# Patient Record
Sex: FEMALE | Race: WHITE | Employment: FULL TIME | URBAN - METROPOLITAN AREA
[De-identification: names, ages, dates, MRNs, and addresses within clinical notes are randomized per-mention and may not be internally consistent; named-entity substitution may affect disease eponyms.]

---

## 2017-07-27 ENCOUNTER — MYC MEDICAL ADVICE (OUTPATIENT)
Dept: FAMILY MEDICINE | Facility: CLINIC | Age: 32
End: 2017-07-27

## 2017-10-08 ENCOUNTER — HEALTH MAINTENANCE LETTER (OUTPATIENT)
Age: 32
End: 2017-10-08

## 2018-04-02 ENCOUNTER — TELEPHONE (OUTPATIENT)
Dept: FAMILY MEDICINE | Facility: CLINIC | Age: 33
End: 2018-04-02

## 2018-04-02 NOTE — TELEPHONE ENCOUNTER
Patient is wanting to schedule a procedure to have her IUD removed with . She is not sure if she wants to have another IUD implanted, but she does want some type of birth control. Please call to advise. Thank you

## 2018-04-09 NOTE — TELEPHONE ENCOUNTER
Patient is schedule for a Physical on 04/27/18. Wants to discuss options for BC, not sure if wants IUD removed or not. Will discuss at physical.  PAWAN Perez

## 2018-04-16 NOTE — PROGRESS NOTES
SUBJECTIVE:   CC: Arely Hebert is an 32 year old woman who presents for preventive health visit.     Healthy Habits:  Answers for HPI/ROS submitted by the patient on 4/27/2018   Annual Exam:  Getting at least 3 servings of Calcium per day:: Yes  Bi-annual eye exam:: NO  Dental care twice a year:: Yes  Sleep apnea or symptoms of sleep apnea:: None  Diet:: Regular (no restrictions)  Frequency of exercise:: None  Taking medications regularly:: Yes  Medication side effects:: Not applicable  Additional concerns today:: YES  PHQ-2 Score: 0          Planning pregnancy but has vacation to [place] in July.  Wondering about timing of IUD removal.      Today's PHQ-2 Score:   PHQ-2 ( 1999 [employer]) 4/27/2018 4/19/2018   Q1: Little interest or pleasure in doing things 0 0   Q2: Feeling down, depressed or hopeless 0 0   PHQ-2 Score 0 0   Q1: Little interest or pleasure in doing things Not at all -   Q2: Feeling down, depressed or hopeless Not at all -   PHQ-2 Score 0 -       Abuse: Current or Past(Physical, Sexual or Emotional)- No  Do you feel safe in your environment - Yes    Social History   Substance Use Topics     Smoking status: Never Smoker     Smokeless tobacco: Never Used     Alcohol use Yes      Comment: OCC     If you drink alcohol do you typically have >3 drinks per day or >7 drinks per week? No                     Reviewed orders with patient.  Reviewed health maintenance and updated orders accordingly - Yes    G 2 P 2   Patient's last menstrual period was 04/09/2018.     Fasting: No   Td: tdap 6/14       Last 3 Pap and HPV Results:   PAP / HPV 8/6/2014 1/25/2012   PAP NIL NIL                  Cholesterol: No results found for: CHOL  No results found for: HDL  No results found for: LDL  No results found for: TRIG  No results found for: CHOLHDLRATIO      MMG: NA  Dexa:  NA     Flex/colo: NA      Seat Belt: Yes    Sunscreen use: Yes   Calcium Intake: adeq  Health Care Directive: Yes   Sexually Active: Yes      Current contraception: Mirena IUD  History of abnormal Pap smear: No  Family history of colon/breast/ovarian cancer: Yes: see [facility]  Regular self breast exam: No  History of abnormal mammogram: No      Labs reviewed in EPIC  BP Readings from Last 3 Encounters:   04/27/18 119/73   04/19/18 120/72   04/15/16 125/77    Wt Readings from Last 3 Encounters:   04/27/18 177 lb (80.3 kg)   04/19/18 178 lb (80.7 kg)   04/15/16 182 lb (82.6 kg)                  Patient Active Problem List   Diagnosis     CARDIOVASCULAR SCREENING; LDL GOAL LESS THAN 160     Encounter for supervision of other normal pregnancy     Past Surgical History:   Procedure Laterality Date     NO HISTORY OF SURGERY         Social History   Substance Use Topics     Smoking status: Never Smoker     Smokeless tobacco: Never Used     Alcohol use Yes      Comment: OCC     Family History   Problem Relation Age of Onset     Breast Cancer Maternal Grandmother          Current Outpatient Prescriptions   Medication Sig Dispense Refill     levonorgestrel (MIRENA) 20 MCG/24HR IUD 1 each (20 mcg) by Intrauterine route daily 1 each 0     cyclobenzaprine (FLEXERIL) 10 MG tablet Take 1 tablet (10 mg) by mouth nightly as needed for muscle spasms (Patient not taking: Reported on 4/27/2018) 30 tablet 1       Mammogram not appropriate for this patient based on age.    Pertinent mammograms are reviewed under the imaging tab.      Reviewed and updated as needed this visit by clinical staff  Tobacco  Allergies  Meds  Problems  Med Hx  Surg Hx  Fam Hx  Soc Hx          Reviewed and updated as needed this visit by Provider  Allergies  Meds  Problems            ROS:  CONSTITUTIONAL: NEGATIVE for fever, chills, change in weight  INTEGUMENTARU/SKIN: NEGATIVE for worrisome rashes, moles or lesions  EYES: NEGATIVE for vision changes or irritation  ENT: NEGATIVE for ear, mouth and throat problems  RESP: NEGATIVE for significant cough or SOB  BREAST: NEGATIVE for masses,  "tenderness or discharge  CV: NEGATIVE for chest pain, palpitations or peripheral edema  GI: NEGATIVE for nausea, abdominal pain, heartburn, or change in bowel habits  : NEGATIVE for unusual urinary or vaginal symptoms. Periods are regular.  MUSCULOSKELETAL: NEGATIVE for significant arthralgias or myalgia  NEURO: NEGATIVE for weakness, dizziness or paresthesias  PSYCHIATRIC: NEGATIVE for changes in mood or affect    OBJECTIVE:   /73  Pulse 85  Temp 99.3  F (37.4  C) (Oral)  Resp 16  Ht 5' 4\" (1.626 m)  Wt 177 lb (80.3 kg)  LMP 04/09/2018  BMI 30.38 kg/m2  EXAM:  GENERAL: healthy, alert and no distress  EYES: Eyes grossly normal to inspection, PERRL and conjunctivae and sclerae normal  HENT: ear canals and TM's normal, nose and mouth without ulcers or lesions  NECK: no adenopathy, no asymmetry, masses, or scars and thyroid normal to palpation  RESP: lungs clear to auscultation - no rales, rhonchi or wheezes  BREAST: normal without masses, tenderness or nipple discharge and no palpable axillary masses or adenopathy  CV: regular rate and rhythm, normal S1 S2, no S3 or S4, no murmur, click or rub, no peripheral edema and peripheral pulses strong  ABDOMEN: soft, nontender, no hepatosplenomegaly, no masses and bowel sounds normal   (female): normal female external genitalia, normal urethral meatus, vaginal mucosa pink, moist, well rugated, and normal cervix/adnexa/uterus without masses or discharge  MS: no gross musculoskeletal defects noted, no edema  SKIN: no suspicious lesions or rashes  NEURO: Normal strength and tone, mentation intact and speech normal  PSYCH: mentation appears normal, affect normal/bright    ASSESSMENT/PLAN:   (Z00.00) Routine general medical examination at a health care facility  (primary encounter diagnosis)  Comment: preventive needs reviewed  Plan: Pap imaged thin layer screen with HPV -         recommended age 30 - 65, HPV High Risk Types         DNA Cervical        Recommend " "she stay on IUD until after vacation or just before.   Start PNV  If neg/NIL HPV/pap ok to go to q5 yr co-testing      COUNSELING:   Reviewed preventive health counseling, as reflected in patient instructions  Special attention given to:        Regular exercise       Healthy diet/nutrition         reports that she has never smoked. She has never used smokeless tobacco.    Estimated body mass index is 30.38 kg/(m^2) as calculated from the following:    Height as of this encounter: 5' 4\" (1.626 m).    Weight as of this encounter: 177 lb (80.3 kg).   Weight management plan: Discussed healthy diet and exercise guidelines and patient will follow up in 12 months in clinic to re-evaluate.    Counseling Resources:  ATP IV Guidelines  Pooled Cohorts Equation Calculator  Breast Cancer Risk Calculator  FRAX Risk Assessment  ICSI Preventive Guidelines  Dietary Guidelines for Americans, 2010  USDA's MyPlate  ASA Prophylaxis  Lung CA Screening    Whit Real MD  [facility]  "

## 2018-04-16 NOTE — PATIENT INSTRUCTIONS
Send [employer] message if unable to schedule IUD removal when you need it.    Preventive Health Recommendations  Female Ages 26 - 39  Yearly exam:   See your health care provider every year in order to    Review health changes.     Discuss preventive care.      Review your medicines if you your doctor has prescribed any.    Until age 30: Get a Pap test every three years (more often if you have had an abnormal result).    After age 30: Talk to your doctor about whether you should have a Pap test every 3 years or have a Pap test with HPV screening every 5 years.   You do not need a Pap test if your uterus was removed (hysterectomy) and you have not had cancer.  You should be tested each year for STDs (sexually transmitted diseases), if you're at risk.   Talk to your provider about how often to have your cholesterol checked.  If you are at risk for diabetes, you should have a diabetes test (fasting glucose).  Shots: Get a flu shot each year. Get a tetanus shot every 10 years.   Nutrition:     Eat at least 5 servings of fruits and vegetables each day.    Eat whole-grain bread, whole-wheat pasta and brown rice instead of white grains and rice.    Talk to your provider about Calcium and Vitamin D.     Lifestyle    Exercise at least 150 minutes a week (30 minutes a day, 5 days of the week). This will help you control your weight and prevent disease.    Limit alcohol to one drink per day.    No smoking.     Wear sunscreen to prevent skin cancer.    See your dentist every six months for an exam and cleaning.

## 2018-04-19 ENCOUNTER — OFFICE VISIT (OUTPATIENT)
Dept: INTERNAL MEDICINE | Facility: CLINIC | Age: 33
End: 2018-04-19
Payer: COMMERCIAL

## 2018-04-19 VITALS
OXYGEN SATURATION: 98 % | WEIGHT: 178 LBS | HEART RATE: 98 BPM | SYSTOLIC BLOOD PRESSURE: 120 MMHG | DIASTOLIC BLOOD PRESSURE: 72 MMHG | RESPIRATION RATE: 14 BRPM | BODY MASS INDEX: 30.32 KG/M2 | TEMPERATURE: 98.4 F

## 2018-04-19 DIAGNOSIS — T14.8XXA MUSCLE STRAIN: Primary | ICD-10-CM

## 2018-04-19 PROCEDURE — 99213 OFFICE O/P EST LOW 20 MIN: CPT | Performed by: INTERNAL MEDICINE

## 2018-04-19 RX ORDER — CYCLOBENZAPRINE HCL 10 MG
10 TABLET ORAL
Qty: 30 TABLET | Refills: 1 | Status: SHIPPED | OUTPATIENT
Start: 2018-04-19 | End: 2018-07-12

## 2018-04-19 ASSESSMENT — PAIN SCALES - GENERAL: PAINLEVEL: SEVERE PAIN (6)

## 2018-04-19 NOTE — PATIENT INSTRUCTIONS
Please take acetaminophen 650mg every 4-6 hours, consistently for the next 3 days.  Please take the muscle relaxant, Flexeril, as prescribed- do not drive or operate heavy machinery for 4 hours after taking.  Use cooling or heating packs-whatever makes your pain better.  return to clinic if your symptoms are not any better by the start of next week, sooner if they get worse.
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
## 2018-04-19 NOTE — MR AVS SNAPSHOT
After Visit Summary   4/19/2018    Arely Hebert    MRN: [id number]           Patient Information     Date Of Birth          1985        Visit Information        Provider Department      4/19/2018 3:30 PM Lexie Gant MD [facility]        Today's Diagnoses     Muscle strain    -  1      Care Instructions    Please take acetaminophen 650mg every 4-6 hours, consistently for the next 3 days.  Please take the muscle relaxant, Flexeril, as prescribed- do not drive or operate heavy machinery for 4 hours after taking.  Use cooling or heating packs-whatever makes your pain better.  return to clinic if your symptoms are not any better by the start of next week, sooner if they get worse.           Follow-ups after your visit        Your next 10 appointments already scheduled     Apr 27, 2018 10:15 AM CDT   PHYSICAL with Whit Real MD   [facility]    [address] [address]   [phone]            Apr 28, 2018  9:00 AM CDT   (Arrive by 8:45 AM)   MA SCREENING DIGITAL BILATERAL with ANDMA1   [facility]    [address] [address]   [phone]           Do not use any powder, lotion or deodorant under your arms or on your breast. If you do, we will ask you to remove it before your exam.  Wear comfortable, two-piece clothing.  If you have any allergies, tell your care team.  Bring any previous mammograms from other facilities or have them mailed to the breast center.              Future tests that were ordered for you today     Open Future Orders        Priority Expected Expires Ordered    MA Screening Digital Bilateral Routine  4/19/2019 4/19/2018            Who to contact     If you have questions or need follow up information about today's clinic visit or your schedule please contact [facility] directly at  [phone].  Normal or non-critical lab and imaging results will be communicated to you by OrSensehart, letter or phone within 4 business days after the clinic has received the results. If you do not hear from us within 7 days, please contact the clinic through Simply Zest[employer] or phone. If you have a critical or abnormal lab result, we will notify you by phone as soon as possible.  Submit refill requests through [employer] or call your pharmacy and they will forward the refill request to us. Please allow 3 business days for your refill to be completed.          Additional Information About Your Visit        OrSensehart Information     [employer] gives you secure access to your electronic health record. If you see a primary care provider, you can also send messages to your care team and make appointments. If you have questions, please call your primary care clinic.  If you do not have a primary care provider, please call [phone] and they will assist you.        Care EveryWhere ID     This is your Care EveryWhere ID. This could be used by other organizations to access your [place] medical records  [id number]        Your Vitals Were     Pulse Temperature Respirations Pulse Oximetry BMI (Body Mass Index)       98 98.4  F (36.9  C) (Oral) 14 98% 30.32 kg/m2        Blood Pressure from Last 3 Encounters:   04/19/18 120/72   04/15/16 125/77   08/06/14 123/74    Weight from Last 3 Encounters:   04/19/18 178 lb (80.7 kg)   04/15/16 182 lb (82.6 kg)   08/06/14 173 lb (78.5 kg)              Today, you had the following     No orders found for display         Today's Medication Changes          These changes are accurate as of 4/19/18  4:12 PM.  If you have any questions, ask your nurse or doctor.               Start taking these medicines.        Dose/Directions    cyclobenzaprine 10 MG tablet   Commonly known as:  FLEXERIL   Used for:  Muscle strain   Started by:  Lexie Gant MD        Dose:  10 mg   Take 1 tablet (10 mg)  by mouth nightly as needed for muscle spasms   Quantity:  30 tablet   Refills:  1            Where to get your medicines      These medications were sent to [facility]/pharmacy #[id number] - [place], [place] - [address],  [address] [facility]  [address], , [facility] [address]     Phone:  [phone]     cyclobenzaprine 10 MG tablet                Primary Care Provider Office Phone # Fax #    José Miguel Ramirez [phone] [address] [address]        Equal Access to Services     [facility]: Hadii aad ku hadasho Soomaali, waaxda luqadaha, qaybta kaalmada adeegyada, marion dumont . So [facility] [phone].    ATENCIÓN: Si habla español, tiene a kerns disposición servicios gratuitos de asistencia lingüística. [facility] [phone].    We comply with applicable federal civil rights laws and [place] laws. We do not discriminate on the basis of race, color, national origin, age, disability, sex, sexual orientation, or gender identity.            Thank you!     Thank you for choosing [facility]  for your care. Our goal is always to provide you with excellent care. Hearing back from our patients is one way we can continue to improve our services. Please take a few minutes to complete the written survey that you may receive in the mail after your visit with us. Thank you!             Your Updated Medication List - Protect others around you: Learn how to safely use, store and throw away your medicines at www.disposemymeds.org.          This list is accurate as of 4/19/18  4:12 PM.  Always use your most recent med list.                   Brand Name Dispense Instructions for use Diagnosis    cyclobenzaprine 10 MG tablet    FLEXERIL    30 tablet    Take 1 tablet (10 mg) by mouth nightly as needed for muscle spasms    Muscle strain       levonorgestrel 20 MCG/24HR IUD    MIRENA    1 each    1 each (20 mcg) by Intrauterine route  daily    Encounter for IUD insertion

## 2018-04-27 ENCOUNTER — OFFICE VISIT (OUTPATIENT)
Dept: FAMILY MEDICINE | Facility: CLINIC | Age: 33
End: 2018-04-27
Payer: COMMERCIAL

## 2018-04-27 VITALS
RESPIRATION RATE: 16 BRPM | BODY MASS INDEX: 30.22 KG/M2 | WEIGHT: 177 LBS | HEART RATE: 85 BPM | TEMPERATURE: 99.3 F | SYSTOLIC BLOOD PRESSURE: 119 MMHG | DIASTOLIC BLOOD PRESSURE: 73 MMHG | HEIGHT: 64 IN

## 2018-04-27 DIAGNOSIS — Z00.00 ROUTINE GENERAL MEDICAL EXAMINATION AT A HEALTH CARE FACILITY: Primary | ICD-10-CM

## 2018-04-27 PROCEDURE — 87624 HPV HI-RISK TYP POOLED RSLT: CPT | Performed by: FAMILY MEDICINE

## 2018-04-27 PROCEDURE — G0145 SCR C/V CYTO,THINLAYER,RESCR: HCPCS | Performed by: FAMILY MEDICINE

## 2018-04-27 PROCEDURE — 99395 PREV VISIT EST AGE 18-39: CPT | Performed by: FAMILY MEDICINE

## 2018-04-27 NOTE — NURSING NOTE
"Chief Complaint   Patient presents with     Physical     Health Maintenance     orders pended        Initial /73  Pulse 85  Temp 99.3  F (37.4  C) (Oral)  Resp 16  Ht 5' 4\" (1.626 m)  Wt 177 lb (80.3 kg)  LMP 04/09/2018  BMI 30.38 kg/m2 Estimated body mass index is 30.38 kg/(m^2) as calculated from the following:    Height as of this encounter: 5' 4\" (1.626 m).    Weight as of this encounter: 177 lb (80.3 kg).  Medication Reconciliation: complete  Thierno Campos CMA    "

## 2018-04-27 NOTE — MR AVS SNAPSHOT
After Visit Summary   4/27/2018    Arely Hebert    MRN: [id number]           Patient Information     Date Of Birth          1985        Visit Information        Provider Department      4/27/2018 10:15 AM Whit Real MD [facility]        Today's Diagnoses     Routine general medical examination at a health care facility    -  1      Care Instructions      Send MyChart message if unable to schedule IUD removal when you need it.    Preventive Health Recommendations  Female Ages 26 - 39  Yearly exam:   See your health care provider every year in order to    Review health changes.     Discuss preventive care.      Review your medicines if you your doctor has prescribed any.    Until age 30: Get a Pap test every three years (more often if you have had an abnormal result).    After age 30: Talk to your doctor about whether you should have a Pap test every 3 years or have a Pap test with HPV screening every 5 years.   You do not need a Pap test if your uterus was removed (hysterectomy) and you have not had cancer.  You should be tested each year for STDs (sexually transmitted diseases), if you're at risk.   Talk to your provider about how often to have your cholesterol checked.  If you are at risk for diabetes, you should have a diabetes test (fasting glucose).  Shots: Get a flu shot each year. Get a tetanus shot every 10 years.   Nutrition:     Eat at least 5 servings of fruits and vegetables each day.    Eat whole-grain bread, whole-wheat pasta and brown rice instead of white grains and rice.    Talk to your provider about Calcium and Vitamin D.     Lifestyle    Exercise at least 150 minutes a week (30 minutes a day, 5 days of the week). This will help you control your weight and prevent disease.    Limit alcohol to one drink per day.    No smoking.     Wear sunscreen to prevent skin cancer.    See your dentist every six months for an exam and cleaning.             Follow-ups after your visit        Your next 10 appointments already scheduled     Apr 28, 2018  9:00 AM CDT   (Arrive by 8:45 AM)   [facility] SCREENING DIGITAL BILATERAL with [facility]   [facility]    [address] [facility] [[email or web address]] [address] [phone]           Do not use any powder, lotion or deodorant under your arms or on your breast. If you do, we will ask you to remove it before your exam.  Wear comfortable, two-piece clothing.  If you have any allergies, tell your care team.  Bring any previous mammograms from other facilities or have them mailed to the breast center.              Who to contact     If you have questions or need follow up information about today's clinic visit or your schedule please contact [facility] directly at [phone].  Normal or non-critical lab and imaging results will be communicated to you by MyChart, letter or phone within 4 business days after the clinic has received the results. If you do not hear from us within 7 days, please contact the clinic through [email or web address] or phone. If you have a critical or abnormal lab result, we will notify you by phone as soon as possible.  Submit refill requests through [facility] or call your pharmacy and they will forward the refill request to us. Please allow 3 business days for your refill to be completed.          Additional Information About Your Visit        [email or web address] Information     [facility] gives you secure access to your electronic health record. If you see a primary care provider, you can also send messages to your care team and make appointments. If you have questions, please call your primary care clinic.  If you do not have a primary care provider, please call [phone] and they will assist you.        Care EveryWhere ID     This is your Care EveryWhere ID. This could be used by other organizations to access your [place] medical records  [id number]        Your Vitals Were     Pulse  "Temperature Respirations Height Last Period BMI (Body Mass Index)    85 99.3  F (37.4  C) (Oral) 16 5' 4\" (1.626 m) 04/09/2018 30.38 kg/m2       Blood Pressure from Last 3 Encounters:   04/27/18 119/73   04/19/18 120/72   04/15/16 125/77    Weight from Last 3 Encounters:   04/27/18 177 lb (80.3 kg)   04/19/18 178 lb (80.7 kg)   04/15/16 182 lb (82.6 kg)              We Performed the Following     HPV High Risk Types DNA Cervical     Pap imaged thin layer screen with HPV - recommended age 30 - 65        Primary Care Provider Office Phone # Fax #    José Miguel Ramirez [phone] [address] [place] [address]        Equal Access to Services     [facility]: Hadii jody chan hadasho Soomaali, waaxda luqadaha, qaybta kaalmada adenicolyamargo, marion dumont . So [place] [phone].    ATENCIÓN: Si habla español, tiene a kerns disposición servicios gratuitos de asistencia lingüística. Ángel al [phone].    We comply with applicable federal civil rights laws and [place] laws. We do not discriminate on the basis of race, color, national origin, age, disability, sex, sexual orientation, or gender identity.            Thank you!     Thank you for choosing [facility]  for your care. Our goal is always to provide you with excellent care. Hearing back from our patients is one way we can continue to improve our services. Please take a few minutes to complete the written survey that you may receive in the mail after your visit with us. Thank you!             Your Updated Medication List - Protect others around you: Learn how to safely use, store and throw away your medicines at www.disposemymeds.org.          This list is accurate as of 4/27/18 10:52 AM.  Always use your most recent med list.                   Brand Name Dispense Instructions for use Diagnosis    cyclobenzaprine 10 MG tablet    FLEXERIL    30 tablet    Take 1 tablet (10 mg) by mouth nightly as needed " for muscle spasms    Muscle strain       levonorgestrel 20 MCG/24HR IUD    MIRENA    1 each    1 each (20 mcg) by Intrauterine route daily    Encounter for IUD insertion

## 2018-04-27 NOTE — LETTER
May 4, 2018    Arely Hebert  [address]  [place] [address]    Dear Arely,  We are happy to inform you that your PAP smear result from 4/27/18 is normal.  We are now able to do a follow up test on PAP smears. The DNA test is for HPV (Human Papilloma Virus). Cervical cancer is closely linked with certain types of HPV. Your results showed no evidence of high risk HPV.  Therefore we recommend you return in 5 years for your next pap smear and HPV test.  You will still need to return to the clinic every year for an annual exam and other preventive tests.  Please contact the clinic at [phone] with any questions.  Sincerely,    Whit Real MD/mechelle

## 2018-05-01 LAB
COPATH REPORT: NORMAL
PAP: NORMAL

## 2018-05-03 LAB
FINAL DIAGNOSIS: NORMAL
HPV HR 12 DNA CVX QL NAA+PROBE: NEGATIVE
HPV16 DNA SPEC QL NAA+PROBE: NEGATIVE
HPV18 DNA SPEC QL NAA+PROBE: NEGATIVE
SPECIMEN DESCRIPTION: NORMAL
SPECIMEN SOURCE CVX/VAG CYTO: NORMAL

## 2018-07-10 ENCOUNTER — TELEPHONE (OUTPATIENT)
Dept: FAMILY MEDICINE | Facility: CLINIC | Age: 33
End: 2018-07-10

## 2018-07-10 NOTE — TELEPHONE ENCOUNTER
Patient is calling to have her IUD taken out stated pcp said she can be squeezed in for this appointment  Please call to advice  Thank you

## 2018-07-12 ENCOUNTER — OFFICE VISIT (OUTPATIENT)
Dept: FAMILY MEDICINE | Facility: CLINIC | Age: 33
End: 2018-07-12
Payer: COMMERCIAL

## 2018-07-12 VITALS
SYSTOLIC BLOOD PRESSURE: 118 MMHG | WEIGHT: 181 LBS | DIASTOLIC BLOOD PRESSURE: 74 MMHG | TEMPERATURE: 98.6 F | RESPIRATION RATE: 16 BRPM | BODY MASS INDEX: 31.07 KG/M2 | HEART RATE: 85 BPM

## 2018-07-12 DIAGNOSIS — Z30.432 ENCOUNTER FOR IUD REMOVAL: Primary | ICD-10-CM

## 2018-07-12 PROCEDURE — 58301 REMOVE INTRAUTERINE DEVICE: CPT | Performed by: FAMILY MEDICINE

## 2018-07-12 ASSESSMENT — PAIN SCALES - GENERAL: PAINLEVEL: NO PAIN (0)

## 2018-07-12 NOTE — PROGRESS NOTES
SUBJECTIVE:  Arely Hebert is a 32 year old female who presents to the clinic today for an IUD removal.  Wants to attempt pregnancy.      PMH/PSH/Meds/All were reviewed and updated at this visit.      OBJECTIVE:  no apparent distress  Blood pressure 118/74, pulse 85, temperature 98.6  F (37  C), temperature source Oral, resp. rate 16, weight 181 lb (82.1 kg).    ASSESSMENT:  IUD correctly in place.  IUD removal.    PLAN:   IUD easily removed intact with a ring forceps.  Reportable signs and symptoms discussed.  Report any fevers or excessive cramps.  Annual PAP smears recommended.  Start PNV  Follow up in 1 year or prn pregnancy.  Whit Real

## 2018-07-12 NOTE — PROGRESS NOTES
"  SUBJECTIVE:   Arely Hebert is a 32 year old female who presents to clinic today for the following health issues:      IUD removal    {additional problems for provider to add:809129}    Problem list and histories reviewed & adjusted, as indicated.  Additional history: {NONE - AS DOCUMENTED:345868::\"as documented\"}    {HIST REVIEW/ LINKS 2:293485}    Reviewed and updated as needed this visit by clinical staff       Reviewed and updated as needed this visit by Provider         {PROVIDER CHARTING PREFERENCE:980290}  "

## 2018-07-12 NOTE — NURSING NOTE
"Chief Complaint   Patient presents with     Contraception     IUD removal       Initial /74  Pulse 85  Temp 98.6  F (37  C) (Oral)  Resp 16  Wt 181 lb (82.1 kg)  BMI 31.07 kg/m2 Estimated body mass index is 31.07 kg/(m^2) as calculated from the following:    Height as of 4/27/18: 5' 4\" (1.626 m).    Weight as of this encounter: 181 lb (82.1 kg).  Medication Reconciliation: complete    YIN Diaz MA    "

## 2018-07-12 NOTE — MR AVS SNAPSHOT
After Visit Summary   7/12/2018    Arely Hebert    MRN: [id number]           Patient Information     Date Of Birth          1985        Visit Information        Provider Department      7/12/2018 7:35 AM Whit Real MD [facility] [place] [facility]        Today's Diagnoses     Encounter for IUD removal    -  1       Follow-ups after your visit        Who to contact     If you have questions or need follow up information about today's clinic visit or your schedule please contact [facility] directly at [phone].  Normal or non-critical lab and imaging results will be communicated to you by Plyn[employer], letter or phone within 4 business days after the clinic has received the results. If you do not hear from us within 7 days, please contact the clinic through [employer] or phone. If you have a critical or abnormal lab result, we will notify you by phone as soon as possible.  Submit refill requests through [employer] [employer] or call your pharmacy and they will forward the refill request to us. Please allow 3 business days for your refill to be completed.          Additional Information About Your Visit        MyChart Information     [employer] gives you secure access to your electronic health record. If you see a primary care provider, you can also send messages to your care team and make appointments. If you have questions, please call your primary care clinic.  If you do not have a primary care provider, please call [phone] and they will assist you.        Care EveryWhere ID     This is your Care EveryWhere ID. This could be used by other organizations to access your [place] medical records  [id number]        Your Vitals Were     Pulse Temperature Respirations BMI (Body Mass Index)          85 98.6  F (37  C) (Oral) 16 31.07 kg/m2         Blood Pressure from Last 3 Encounters:   07/12/18 118/74   04/27/18 119/73   04/19/18 120/72    Weight from Last 3 Encounters:   07/12/18 181 lb  (82.1 kg)   04/27/18 177 lb (80.3 kg)   04/19/18 178 lb (80.7 kg)              We Performed the Following     REMOVE INTRAUTERINE DEVICE        Primary Care Provider Office Phone # Fax #    Whit Real [phone] [address] [facility] [address]        Equal Access to Services     [facility]: Hadii aad ku hadasho Soomaali, waaxda luqadaha, qaybta kaalmada adeegyada, waxay idiin hayaan adeeg kharash la'aan . So [facility] [phone].    ATENCIÓN: Si habla español, tiene a kerns disposición servicios gratuitos de asistencia lingüística. Llame al [phone].    We comply with applicable federal civil rights laws and [place] laws. We do not discriminate on the basis of race, color, national origin, age, disability, sex, sexual orientation, or gender identity.            Thank you!     Thank you for choosing [facility]  for your care. Our goal is always to provide you with excellent care. Hearing back from our patients is one way we can continue to improve our services. Please take a few minutes to complete the written survey that you may receive in the mail after your visit with us. Thank you!             Your Updated Medication List - Protect others around you: Learn how to safely use, store and throw away your medicines at www.disposemymeds.org.          This list is accurate as of 7/12/18  8:15 AM.  Always use your most recent med list.                   Brand Name Dispense Instructions for use Diagnosis    FISH OIL PO           PRENATAL PO

## 2018-11-17 ENCOUNTER — MYC MEDICAL ADVICE (OUTPATIENT)
Dept: FAMILY MEDICINE | Facility: CLINIC | Age: 33
End: 2018-11-17

## 2018-11-19 NOTE — TELEPHONE ENCOUNTER
Per protocol, will route encounter to be cosigned by provider for Verbal Orders.  Louann Reyes RN

## 2018-12-20 ENCOUNTER — OFFICE VISIT (OUTPATIENT)
Dept: OBGYN | Facility: CLINIC | Age: 33
End: 2018-12-20
Payer: COMMERCIAL

## 2018-12-20 VITALS
OXYGEN SATURATION: 99 % | DIASTOLIC BLOOD PRESSURE: 82 MMHG | BODY MASS INDEX: 30.49 KG/M2 | HEIGHT: 64 IN | TEMPERATURE: 98.4 F | WEIGHT: 178.6 LBS | SYSTOLIC BLOOD PRESSURE: 137 MMHG | HEART RATE: 120 BPM

## 2018-12-20 DIAGNOSIS — Z36.89 ENCOUNTER TO ESTABLISH GESTATIONAL AGE USING ULTRASOUND: ICD-10-CM

## 2018-12-20 DIAGNOSIS — N91.2 ABSENCE OF MENSTRUATION: Primary | ICD-10-CM

## 2018-12-20 DIAGNOSIS — Z23 NEED FOR PROPHYLACTIC VACCINATION AND INOCULATION AGAINST INFLUENZA: ICD-10-CM

## 2018-12-20 LAB — BETA HCG QUAL IFA URINE: POSITIVE

## 2018-12-20 PROCEDURE — 90471 IMMUNIZATION ADMIN: CPT | Performed by: NURSE PRACTITIONER

## 2018-12-20 PROCEDURE — 99202 OFFICE O/P NEW SF 15 MIN: CPT | Mod: 25 | Performed by: NURSE PRACTITIONER

## 2018-12-20 PROCEDURE — 90686 IIV4 VACC NO PRSV 0.5 ML IM: CPT | Performed by: NURSE PRACTITIONER

## 2018-12-20 PROCEDURE — 84703 CHORIONIC GONADOTROPIN ASSAY: CPT | Performed by: NURSE PRACTITIONER

## 2018-12-20 SDOH — HEALTH STABILITY: MENTAL HEALTH: HOW OFTEN DO YOU HAVE A DRINK CONTAINING ALCOHOL?: NEVER

## 2018-12-20 ASSESSMENT — PAIN SCALES - GENERAL: PAINLEVEL: NO PAIN (0)

## 2018-12-20 ASSESSMENT — MIFFLIN-ST. JEOR: SCORE: 1509.09

## 2018-12-20 NOTE — PROGRESS NOTES
S: Pt is a 33 year old,  2 para 2 who presents today with absence of menses. LMP was 18. Cycles irregular after IUD was removed.  IUD for contraception and was discontinued July.  Complaints: No concerns.  Previous Pap smear 2018.  No history of abnormal pap smear.  Pertinent Past Obstetric and Gynecological Medical History: Forceps needed during first delivery.   Patient past medical, surgical, family, and social history reviewed.    O: General appearance: Well appearing female in no acute distress. Answers questions and maintains eye contact appropriately.  RESPIRATORY: Clear to auscultation bilaterally.  CV: Regular rate and rhythm without murmur, gallop, rub  ABDOMEN: Soft, nontender, nondistended, normoactive bowel sounds. No hepatosplenomegaly. No guarding, rebounding, or rigidity.  UPT Positive    A: Missed Menses  Weeks gestation: 7 weeks  CONCEPCION: 19    P: 1) Prenatal vitamins discussed and currently taking.  2) Diet, exercise, work, and environmental hazards reviewed. Discussed delivery hospital, providers, prenatal visit schedule. Early ultrasound ordered to confirm dates and viability. Toxoplasmosis precautions Na. Discussed avoidance of tobacco, ETOH, and street drugs. Signs and symptoms to monitor for and report discussed. Will schedule first OB visit at 10-12 weeks gestation.   Has new prenatal visit scheduled with Dr. Real already-states already spoke with her care team. Prefers [facility] for delivery.    Kristin SHERWOOD CNP

## 2018-12-20 NOTE — PROGRESS NOTES

## 2018-12-28 ENCOUNTER — ANCILLARY PROCEDURE (OUTPATIENT)
Dept: ULTRASOUND IMAGING | Facility: CLINIC | Age: 33
End: 2018-12-28
Attending: NURSE PRACTITIONER
Payer: COMMERCIAL

## 2018-12-28 PROCEDURE — 76801 OB US < 14 WKS SINGLE FETUS: CPT

## 2018-12-28 PROCEDURE — 76817 TRANSVAGINAL US OBSTETRIC: CPT

## 2019-01-10 ENCOUNTER — MYC MEDICAL ADVICE (OUTPATIENT)
Dept: FAMILY MEDICINE | Facility: CLINIC | Age: 34
End: 2019-01-10

## 2019-01-10 ENCOUNTER — PRENATAL OFFICE VISIT (OUTPATIENT)
Dept: FAMILY MEDICINE | Facility: CLINIC | Age: 34
End: 2019-01-10
Payer: COMMERCIAL

## 2019-01-10 VITALS
DIASTOLIC BLOOD PRESSURE: 75 MMHG | TEMPERATURE: 98.8 F | SYSTOLIC BLOOD PRESSURE: 118 MMHG | WEIGHT: 179.4 LBS | HEART RATE: 84 BPM | RESPIRATION RATE: 16 BRPM | BODY MASS INDEX: 30.63 KG/M2 | HEIGHT: 64 IN

## 2019-01-10 DIAGNOSIS — Z34.81 ENCOUNTER FOR SUPERVISION OF OTHER NORMAL PREGNANCY IN FIRST TRIMESTER: Primary | ICD-10-CM

## 2019-01-10 LAB
ABO + RH BLD: NORMAL
ABO + RH BLD: NORMAL
BLD GP AB SCN SERPL QL: NORMAL
BLOOD BANK CMNT PATIENT-IMP: NORMAL
ERYTHROCYTE [DISTWIDTH] IN BLOOD BY AUTOMATED COUNT: 13.2 % (ref 10–15)
HCT VFR BLD AUTO: 37 % (ref 35–47)
HGB BLD-MCNC: 12.6 G/DL (ref 11.7–15.7)
MCH RBC QN AUTO: 29.3 PG (ref 26.5–33)
MCHC RBC AUTO-ENTMCNC: 34.1 G/DL (ref 31.5–36.5)
MCV RBC AUTO: 86 FL (ref 78–100)
PLATELET # BLD AUTO: 232 10E9/L (ref 150–450)
RBC # BLD AUTO: 4.3 10E12/L (ref 3.8–5.2)
SPECIMEN EXP DATE BLD: NORMAL
T4 FREE SERPL-MCNC: 1.17 NG/DL (ref 0.76–1.46)
TSH SERPL DL<=0.005 MIU/L-ACNC: 0.16 MU/L (ref 0.4–4)
WBC # BLD AUTO: 8.5 10E9/L (ref 4–11)

## 2019-01-10 PROCEDURE — 87491 CHLMYD TRACH DNA AMP PROBE: CPT | Performed by: FAMILY MEDICINE

## 2019-01-10 PROCEDURE — 87086 URINE CULTURE/COLONY COUNT: CPT | Performed by: FAMILY MEDICINE

## 2019-01-10 PROCEDURE — 86850 RBC ANTIBODY SCREEN: CPT | Performed by: FAMILY MEDICINE

## 2019-01-10 PROCEDURE — 86762 RUBELLA ANTIBODY: CPT | Performed by: FAMILY MEDICINE

## 2019-01-10 PROCEDURE — 87389 HIV-1 AG W/HIV-1&-2 AB AG IA: CPT | Performed by: FAMILY MEDICINE

## 2019-01-10 PROCEDURE — 85027 COMPLETE CBC AUTOMATED: CPT | Performed by: FAMILY MEDICINE

## 2019-01-10 PROCEDURE — 99207 ZZC FIRST OB VISIT: CPT | Performed by: FAMILY MEDICINE

## 2019-01-10 PROCEDURE — 87340 HEPATITIS B SURFACE AG IA: CPT | Performed by: FAMILY MEDICINE

## 2019-01-10 PROCEDURE — 36415 COLL VENOUS BLD VENIPUNCTURE: CPT | Performed by: FAMILY MEDICINE

## 2019-01-10 PROCEDURE — 84443 ASSAY THYROID STIM HORMONE: CPT | Performed by: FAMILY MEDICINE

## 2019-01-10 PROCEDURE — 84439 ASSAY OF FREE THYROXINE: CPT | Performed by: FAMILY MEDICINE

## 2019-01-10 PROCEDURE — 86787 VARICELLA-ZOSTER ANTIBODY: CPT | Performed by: FAMILY MEDICINE

## 2019-01-10 PROCEDURE — 86900 BLOOD TYPING SEROLOGIC ABO: CPT | Performed by: FAMILY MEDICINE

## 2019-01-10 PROCEDURE — 86901 BLOOD TYPING SEROLOGIC RH(D): CPT | Performed by: FAMILY MEDICINE

## 2019-01-10 PROCEDURE — 86780 TREPONEMA PALLIDUM: CPT | Performed by: FAMILY MEDICINE

## 2019-01-10 ASSESSMENT — MIFFLIN-ST. JEOR: SCORE: 1512.72

## 2019-01-10 NOTE — PATIENT INSTRUCTIONS
[facility] [phone] for concerns about pregnancy or Labor.      Next visit 4 weeks.

## 2019-01-10 NOTE — PROGRESS NOTES
"Arely Hebert  is a 33 year old  who presents to the clinic for a new ob visit.    Estimated Date of Delivery: Aug 20, 2019 is calculated from Patient's last menstrual period was 11/01/2018 (exact date).   She has not had bleeding since her LMP.   She has had mild nausea. Weigh loss has not occurred.   This was a planned pregnancy.   FOB is involved,      OTHER CONCERNS: fatigue, breast ttp    INFECTION HISTORY  HIV: No  Hepatitis B: No  Hepatitis C: No  Syphilis:  No  Tuberculosis: no   PPD- no  Herpes self: no  Herpes partner:  no  Chlamydia:  no  Gonorrhea:  no  HPV: no  BV:  no  Trichomonis:  no  Chicken Pox:  YES had disease  ====================================================  PERSONAL/SOCIAL HISTORY  Lives lives with their family.  Employment: Full time.  Her job involves moderate activity .  Additional items: None  =====================================================   REVIEW OF SYSTEMS  CONSTITUTIONAL: NEGATIVE for fever, chills  EYES: NEGATIVE for vision changes   RESP: NEGATIVE for significant cough or SOB  CV: NEGATIVE for chest pain, palpitations   GI: NEGATIVE for nausea, abdominal pain, heartburn, or change in bowel habits  : NEGATIVE for frequency, dysuria, or hematuria  MUSCULOSKELETAL: NEGATIVE for significant arthralgias or myalgia  NEURO: NEGATIVE for weakness, dizziness or paresthesias or headache  ====================================================  PHYSICAL EXAM:  /75   Pulse 84   Temp 98.8  F (37.1  C) (Oral)   Resp 16   Ht 1.632 m (5' 4.25\")   Wt 81.4 kg (179 lb 6.4 oz)   LMP 11/01/2018 (Exact Date)   BMI 30.55 kg/m    BMI- Body mass index is 30.55 kg/m .,     RECOMMENDED WEIGHT GAIN: < 15 lbs.  GENERAL:  Pleasant pregnant female, alert, well groomed.  SKIN:  Warm and dry, without lesions or rashes  HEAD: Symmetrical features.  EYES:  PERRLA,   MOUTH:  Buccal mucosa pink, moist without lesions.    NECK:  Thyroid without enlargement and nodules.  Lymph nodes not " palpable.   LUNGS:  Clear to auscultation.     HEART:  RRR without murmur.  ABDOMEN: Soft without masses , tenderness or organomegaly.  No CVA tenderness. No scars noted.. FHT visible on bedside US, single live IUP  MENTAL STATUS:  Alert, oriented x3.  Speech fluent with normal naming, repetition, comprehension.   CRANIAL NERVES:   Pupils are equal, round, reactive to light.  Extraocular movements full.  Visual fields full.  Facial sensation, movement normal.  Palate moves symmetrically.  Tongue midline.  Sternocleidomastoid and trapezius strength intact.    Motor 5/5.  Reflexes 2/4.   EXTREMITIES:  No edema. No significant varicosities.   GENITALIA:  BUS WNL, no lesions noted   VAGINA:  Pink, normal rugae and discharge normal and physiologic,   CERVIX:  smooth, without discharge or CMT and parous os,   firm/ closed 3 cm long.  UTERUS: Anteverted, nontender 8 weeks in size.  ADNEXA: Without masses or tenderness  PELVIS:   Adequate, Pelvis proven to 7 pounds 11 ounces.  .      =========================================  ASSESSMENT:  (Z34.81) Encounter for supervision of other normal pregnancy in first trimester  (primary encounter diagnosis)  Comment: see below  Plan: CBC with platelets, Rubella Antibody IgG         Quantitative, Treponema Abs w Reflex to RPR and        Titer, Hepatitis B surface antigen, HIV Antigen        Antibody Combo, Chlamydia trachomatis PCR,         Urine Culture Aerobic Bacterial, TSH with free         T4 reflex, ABO/Rh type and screen, Varicella         Zoster Virus Antibody IgG             PREGNANCY AT RISK? no  ==========================================  PLAN:  Instructed on use of triage nurse line and contacting the on call provider after hours for an urgent need such as fever, vagina bleeding, bladder or vaginal infection, rupture of membranes,  or term labor.    Discussed the indications, uses for and false positives for quad screen, nuchal translucency and fetal survey  ultrasound at 18-20 weeks gestation. Will inform us at the next visit if she wished to avail herself of these screens.  Instructed on best evidence for: weight gain for her BMI for pregnancy; healthy diet and foods to avoid; exercise and activity during pregnancy;avoiding exposure to toxoplasmosis; and maintenance of a generally healthy lifestyle.   Discussed the harms, benefits, side effects and alternative therapies for current prescribed and OTC medications.

## 2019-01-10 NOTE — NURSING NOTE
".  Chief Complaint   Patient presents with     Prenatal Care     taylor 8/8/19       Initial /75   Pulse 84   Temp 98.8  F (37.1  C) (Oral)   Resp 16   Ht 1.632 m (5' 4.25\")   Wt 81.4 kg (179 lb 6.4 oz)   LMP 11/01/2018 (Exact Date)   BMI 30.55 kg/m   Estimated body mass index is 30.55 kg/m  as calculated from the following:    Height as of this encounter: 1.632 m (5' 4.25\").    Weight as of this encounter: 81.4 kg (179 lb 6.4 oz).  Medication Reconciliation: complete  Thierno Campos CMA    "

## 2019-01-11 LAB
C TRACH DNA SPEC QL NAA+PROBE: NEGATIVE
HBV SURFACE AG SERPL QL IA: NONREACTIVE
HIV 1+2 AB+HIV1 P24 AG SERPL QL IA: NONREACTIVE
RUBV IGG SERPL IA-ACNC: 28 IU/ML
SPECIMEN SOURCE: NORMAL
T PALLIDUM AB SER QL: NONREACTIVE
VZV IGG SER QL IA: 4.3 AI (ref 0–0.8)

## 2019-01-12 LAB
BACTERIA SPEC CULT: NORMAL
SPECIMEN SOURCE: NORMAL

## 2019-01-14 NOTE — TELEPHONE ENCOUNTER
Per protocol, will route encounter to be cosigned by provider for Verbal Orders.  Louann Reyes RN

## 2019-01-15 ENCOUNTER — NURSE TRIAGE (OUTPATIENT)
Dept: NURSING | Facility: CLINIC | Age: 34
End: 2019-01-15

## 2019-01-15 ENCOUNTER — MYC MEDICAL ADVICE (OUTPATIENT)
Dept: FAMILY MEDICINE | Facility: CLINIC | Age: 34
End: 2019-01-15

## 2019-01-16 ENCOUNTER — TELEPHONE (OUTPATIENT)
Dept: FAMILY MEDICINE | Facility: CLINIC | Age: 34
End: 2019-01-16

## 2019-01-16 NOTE — TELEPHONE ENCOUNTER
Routed to Dr. Real to review and advise if any further evaluation is recommended based on pt message below.    Attempted to reach pt for further triage of MyChart message recieved. There was no answer.     Left message indicating MyChart messages are not reviewed by clinic staff after 7pm so pt should contact [place] [facility] through [facility] number [phone] any time clinic is closed including beginning at 7pm today to speak with a nurse from 24-hour-nurse line. Advised if pt has any new sx or worsening sx beyond what she reported in her MyChart message, then she should contact [facility] to discuss as further messages will not be seen by clinic staff until tomorrow morning.    Jocelyn Mares, RN

## 2019-01-16 NOTE — TELEPHONE ENCOUNTER
"9 wks pregnant, , Whit Real, [facility]. Pt reports she has pink tinge on toilet paper tonight when she wipes. No red bleeding. No bleeding or discharge when not wiping. No clots or tissue passed. Denies abdominal pain. No dizziness. Feels well. Triaged to home care. Disc'd home care advice and provided reassurance. Advised call back if increased bleeding, abd/pelvic/back pain, other pain, passes clot or tissue, fever, dizzy, any new/worse sx, questions or concerns. Pt voiced understanding and agreement.       Additional Information    Negative: Shock suspected (e.g., cold/pale/clammy skin, too weak to stand, low BP, rapid pulse)    Negative: Difficult to awaken or acting confused  (e.g., disoriented, slurred speech)    Negative: Passed out (i.e., lost consciousness, collapsed and was not responding)    Negative: Sounds like a life-threatening emergency to the triager    Negative: [1] Vaginal bleeding AND [2] pregnant > 20 weeks    Negative: Not pregnant or pregnancy status unknown    Negative: SEVERE abdominal pain    Negative: [1] SEVERE vaginal bleeding (i.e., soaking 2 pads / hour, large blood clots) AND [2] present 2 or more hours    Negative: [1] MODERATE vaginal bleeding (i.e., soaking 1 pad / hour; clots) AND [2] present > 6 hours    Negative: [1] MODERATE vaginal bleeding (i.e., soaking 1 pad / hour; clots) AND [2] pregnant > 12 weeks    Negative: Passed tissue (e.g., gray-white)    Negative: Shoulder pain    Negative: Pale skin (pallor) of new onset or worsening     na    Negative: Patient sounds very sick or weak to the triager    Negative: [1] Constant abdominal pain AND [2] present > 2 hours    Negative: Fever > 100.4 F (38.0 C)    Negative: [1] Intermittent lower abdominal pain (e.g., cramping) AND [2] present > 24 hours    Negative: Prior history of \"ectopic pregnancy\" or previous tubal surgery (e.g., tubal ligation)    Negative: Burning with urination    Negative: Has IUD    Negative: MILD " vaginal bleeding (i.e., clots or similar to menstrual period; not just spotting)    Negative: SPOTTING lasts > 48 hours or spotting happens more than once in a week    Negative: Unusual vaginal discharge (e.g., bad smelling, yellow, green, or foamy-white)    Negative: Not feeling pregnant any longer (e.g., breast tenderness or nausea has disappeared)    Negative: SPOTTING after sexual intercourse (single or brief episode) (all triage questions negative)    Negative: SPOTTING after a pelvic examination (single or brief episode) (all triage questions negative)    SPOTTING (single or brief episode) (all triage questions negative)    Protocols used: PREGNANCY - VAGINAL BLEEDING LESS THAN 20 WEEKS [facility]-ADULT-

## 2019-01-16 NOTE — TELEPHONE ENCOUNTER
Since yesterday, had some bright pink/red on toilet paper yesterday x 2 after wiping, then resolved.  Today little pink discharge in the morning, then a stringy brown discharge when she wipes.  Last relations 1 week ago.  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  Slight cramping, last night, with a sharp pain.  Again moves a lot during the day.     Information above is reviewed with Dr Whit Real, Verbal Orders would be willing to place an ultrasound order, however because she had an ultrasound in December and a bedside ultrasound where the heartbeat was visualized last week, there is < 5 % chance of miscarriage.  Most likely this is subchorionic bleeding.  Again, if she would like an ultrasound, would be happy to facilitate this.  That being said, worse case scenario, if this is a miscarriage, unable to intervene.      Options, wait to see if symptoms worsen or proceed with an ultrasound.    Patient prefers to wait to see at this time.  Warning signs and symptoms are reviewed and pt should contact clinic for direction, worsening bleeding, fever, increase cramps, doubled over in pain, unable to walk upright or to go to emergency department, or call 911.      9w1d     Patient/parent verbalized understanding of instructions provided and agreed with the plan of care    Per protocol, will route encounter to be cosigned by provider for Verbal Orders.  Louann Reyes RN

## 2019-01-16 NOTE — TELEPHONE ENCOUNTER
Patient is calling regarding spotting last night and then this morning there was a dark brown stringy clot with wiping.  Please call to advise.  Thank you

## 2019-02-07 ENCOUNTER — MYC MEDICAL ADVICE (OUTPATIENT)
Dept: FAMILY MEDICINE | Facility: CLINIC | Age: 34
End: 2019-02-07

## 2019-02-07 ENCOUNTER — PRENATAL OFFICE VISIT (OUTPATIENT)
Dept: FAMILY MEDICINE | Facility: CLINIC | Age: 34
End: 2019-02-07
Payer: COMMERCIAL

## 2019-02-07 VITALS
WEIGHT: 176.6 LBS | RESPIRATION RATE: 18 BRPM | HEART RATE: 102 BPM | TEMPERATURE: 98.3 F | BODY MASS INDEX: 30.08 KG/M2 | SYSTOLIC BLOOD PRESSURE: 138 MMHG | DIASTOLIC BLOOD PRESSURE: 77 MMHG

## 2019-02-07 DIAGNOSIS — Z34.81 ENCOUNTER FOR SUPERVISION OF OTHER NORMAL PREGNANCY IN FIRST TRIMESTER: Primary | ICD-10-CM

## 2019-02-07 PROCEDURE — 99207 ZZC PRENATAL VISIT: CPT | Performed by: FAMILY MEDICINE

## 2019-02-07 NOTE — PROGRESS NOTES
Doing well.  Very anxious, wants ultrasound during visit today because several people she knows have had miscarriages lately, mild cramping last night and today  Prenatal flowsheet information is reviewed.  Discussed triple/quad screening.  She declines testing at this time.  Reportable signs and symptoms discussed.  Reassurance, easily found fhr with doppler, no ultrasound needed today.  F/u 4 weeks

## 2019-02-07 NOTE — PATIENT INSTRUCTIONS
Report to or call [facility] [phone] for concerns about pregnancy or Labor.      Next visit 4 weeks

## 2019-02-07 NOTE — NURSING NOTE
"Chief Complaint   Patient presents with     Prenatal Care     cramping off and on last night and this morning, less nausea        Initial /77   Pulse 102   Temp 98.3  F (36.8  C) (Oral)   Resp 18   Wt 80.1 kg (176 lb 9.6 oz)   LMP 11/01/2018 (Exact Date)   BMI 30.08 kg/m   Estimated body mass index is 30.08 kg/m  as calculated from the following:    Height as of 1/10/19: 1.632 m (5' 4.25\").    Weight as of this encounter: 80.1 kg (176 lb 9.6 oz).  Medication Reconciliation: complete  Thierno Campos CMA    "

## 2019-03-06 ENCOUNTER — PRENATAL OFFICE VISIT (OUTPATIENT)
Dept: FAMILY MEDICINE | Facility: CLINIC | Age: 34
End: 2019-03-06
Payer: COMMERCIAL

## 2019-03-06 VITALS
BODY MASS INDEX: 30.55 KG/M2 | RESPIRATION RATE: 16 BRPM | TEMPERATURE: 98.2 F | HEART RATE: 103 BPM | SYSTOLIC BLOOD PRESSURE: 115 MMHG | WEIGHT: 179.4 LBS | DIASTOLIC BLOOD PRESSURE: 76 MMHG

## 2019-03-06 DIAGNOSIS — Z34.81 ENCOUNTER FOR SUPERVISION OF OTHER NORMAL PREGNANCY IN FIRST TRIMESTER: Primary | ICD-10-CM

## 2019-03-06 PROCEDURE — 99207 ZZC PRENATAL VISIT: CPT | Performed by: FAMILY MEDICINE

## 2019-03-06 NOTE — NURSING NOTE
"Chief Complaint   Patient presents with     Prenatal Care       Initial /76   Pulse 103   Temp 98.2  F (36.8  C) (Oral)   Resp 16   Wt 81.4 kg (179 lb 6.4 oz)   LMP 11/01/2018 (Exact Date)   BMI 30.55 kg/m   Estimated body mass index is 30.55 kg/m  as calculated from the following:    Height as of 1/10/19: 1.632 m (5' 4.25\").    Weight as of this encounter: 81.4 kg (179 lb 6.4 oz).  Medication Reconciliation: complete  Thierno Campos CMA    "

## 2019-03-06 NOTE — PATIENT INSTRUCTIONS
Report to or call [facility] [phone] for concerns about pregnancy or Labor.    Next visit 4 weeks    Please  call [phone] to schedule your ultrasound.

## 2019-03-06 NOTE — PROGRESS NOTES
Doing well.  No concerns today.  Prenatal flowsheet information is reviewed.  Discussed triple/quad screening.  She declines testing at this time.  Reportable signs and symptoms discussed.  Improved anxiety  US at 18-20 weeks  Labs reviewed  Plan tdap at 26 wk visit  F/u 4 weeks.

## 2019-03-31 ENCOUNTER — NURSE TRIAGE (OUTPATIENT)
Dept: NURSING | Facility: CLINIC | Age: 34
End: 2019-03-31

## 2019-03-31 NOTE — TELEPHONE ENCOUNTER
"Caller is experiencing upper abdominal pain for past hour while sitting in restaurant eating; 19 weeks pregnant   No bleeding or cramping or pelvic pain  Triage protocol reviewed  Advised in immediate care using antacid and  change of position   Advised to call back if pain not relieved within  2 hours or persists intermittently for  24 hrs or if she develops any new or worsening    Understands and will comply     Mercedes Bates RN  FNA      Reason for Disposition    [1] Upper abdominal pains AND [2] radiate into chest, with sour taste in mouth    Additional Information    Negative: Passed out (i.e., lost consciousness, collapsed and was not responding)    Negative: Shock suspected (e.g., cold/pale/clammy skin, too weak to stand, low BP, rapid pulse)    Negative: Difficult to awaken or acting confused  (e.g., disoriented, slurred speech)    Negative: Sounds like a life-threatening emergency to the triager    Negative: Followed an abdomen (stomach) injury    Negative: [1] Abdominal pain AND [2] pregnant > 20 weeks    Negative: MODERATE-SEVERE abdominal pain (e.g., interferes with normal activities, awakens from sleep)    Negative: [1] SEVERE vaginal bleeding (i.e., soaking 2 pads / hour, large blood clots) AND [2] present 2 or more hours    Negative: [1] MODERATE vaginal bleeding (i.e., soaking 1 pad / hour; clots) AND [2] present > 6 hours    Negative: [1] MODERATE vaginal bleeding (i.e., soaking 1 pad / hour; clots) AND [2] pregnant > 12 weeks    Negative: Passed tissue (e.g., gray-white)    Negative: [1] Vomiting AND [2] contains red blood or black (\"coffee ground\") material  (Exception: few red streaks in vomit that only happened once)    Negative: Shoulder pain    Negative: Lightheadedness or dizziness (e.g., feels like passing out)    Negative: Patient sounds very sick or weak to the triager    Negative: [1] Constant abdominal pain AND [2] present > 2 hours    Negative: Blood in urine (red, pink, or " "tea-colored)    Negative: Fever > 100.4 F (38.0 C)    Negative: White of the eyes have turned yellow (i.e., jaundice)    Negative: [1] Intermittent lower abdominal pain (e.g., cramping) AND [2] present > 24 hours    Negative: MILD vaginal bleeding (e.g., < 1 pad / hour) or SPOTTING    Negative: Discomfort when passing urine (e.g., pain, burning or stinging)    Negative: Prior history of \"ectopic pregnancy\" or previous tubal surgery (e.g., tubal ligation)    Protocols used: PREGNANCY - ABDOMINAL PAIN LESS THAN 20 WEEKS EGA-ADULT-      "

## 2019-04-03 ENCOUNTER — ANCILLARY PROCEDURE (OUTPATIENT)
Dept: ULTRASOUND IMAGING | Facility: CLINIC | Age: 34
End: 2019-04-03
Attending: FAMILY MEDICINE
Payer: COMMERCIAL

## 2019-04-03 DIAGNOSIS — Z34.81 ENCOUNTER FOR SUPERVISION OF OTHER NORMAL PREGNANCY IN FIRST TRIMESTER: ICD-10-CM

## 2019-04-03 PROCEDURE — 76805 OB US >/= 14 WKS SNGL FETUS: CPT

## 2019-04-09 ENCOUNTER — PRENATAL OFFICE VISIT (OUTPATIENT)
Dept: FAMILY MEDICINE | Facility: CLINIC | Age: 34
End: 2019-04-09
Payer: COMMERCIAL

## 2019-04-09 VITALS
SYSTOLIC BLOOD PRESSURE: 111 MMHG | RESPIRATION RATE: 18 BRPM | TEMPERATURE: 98.6 F | WEIGHT: 183.2 LBS | BODY MASS INDEX: 31.2 KG/M2 | DIASTOLIC BLOOD PRESSURE: 70 MMHG | HEART RATE: 96 BPM

## 2019-04-09 DIAGNOSIS — Z34.82 ENCOUNTER FOR SUPERVISION OF OTHER NORMAL PREGNANCY IN SECOND TRIMESTER: Primary | ICD-10-CM

## 2019-04-09 PROCEDURE — 99207 ZZC PRENATAL VISIT: CPT | Performed by: FAMILY MEDICINE

## 2019-04-09 NOTE — PATIENT INSTRUCTIONS
Report to or call [facility] [phone] for concerns about pregnancy or Labor.    Next visit 6 weeks

## 2019-04-09 NOTE — PROGRESS NOTES
Doing well.  No concerns today.  Prenatal flowsheet information is reviewed.  Discussed kick counts and fetal movement.  Reportable signs and symptoms discussed.  F/u 5 weeks for 1 hr testing.  US reviewed, cwd

## 2019-04-09 NOTE — NURSING NOTE
"Chief Complaint   Patient presents with     Prenatal Care     go over ultrasound results        Initial /70   Pulse 96   Temp 98.6  F (37  C) (Oral)   Resp 18   Wt 83.1 kg (183 lb 3.2 oz)   LMP 11/01/2018 (Exact Date)   BMI 31.20 kg/m   Estimated body mass index is 31.2 kg/m  as calculated from the following:    Height as of 1/10/19: 1.632 m (5' 4.25\").    Weight as of this encounter: 83.1 kg (183 lb 3.2 oz).  Medication Reconciliation: complete  Thierno Campos CMA    "

## 2019-05-02 ENCOUNTER — DOCUMENTATION ONLY (OUTPATIENT)
Dept: FAMILY MEDICINE | Facility: CLINIC | Age: 34
End: 2019-05-02

## 2019-05-02 DIAGNOSIS — Z34.82 ENCOUNTER FOR SUPERVISION OF OTHER NORMAL PREGNANCY IN SECOND TRIMESTER: Primary | ICD-10-CM

## 2019-05-02 NOTE — PROGRESS NOTES
Patient is scheduled for a Lab appointment on 5/8/2019 for 1 hour Glucose test.  Please enter future orders, or call to advise patient to cancel appointment if labs are not needed.  Thank you.

## 2019-05-03 NOTE — PROGRESS NOTES
No need for separate lab appt for this, will do as part of ob visit, please notify pt can check in early and start but separate visit not needed.

## 2019-05-08 ENCOUNTER — PRENATAL OFFICE VISIT (OUTPATIENT)
Dept: FAMILY MEDICINE | Facility: CLINIC | Age: 34
End: 2019-05-08
Payer: COMMERCIAL

## 2019-05-08 VITALS
BODY MASS INDEX: 31.2 KG/M2 | TEMPERATURE: 97.7 F | WEIGHT: 183.2 LBS | DIASTOLIC BLOOD PRESSURE: 75 MMHG | RESPIRATION RATE: 18 BRPM | SYSTOLIC BLOOD PRESSURE: 121 MMHG | HEART RATE: 93 BPM

## 2019-05-08 DIAGNOSIS — R11.0 NAUSEA: Primary | ICD-10-CM

## 2019-05-08 DIAGNOSIS — Z34.82 ENCOUNTER FOR SUPERVISION OF OTHER NORMAL PREGNANCY IN SECOND TRIMESTER: ICD-10-CM

## 2019-05-08 LAB
GLUCOSE 1H P 50 G GLC PO SERPL-MCNC: 62 MG/DL (ref 60–129)
HGB BLD-MCNC: 11.9 G/DL (ref 11.7–15.7)
T4 FREE SERPL-MCNC: 0.98 NG/DL (ref 0.76–1.46)
TSH SERPL DL<=0.005 MIU/L-ACNC: 0.26 MU/L (ref 0.4–4)

## 2019-05-08 PROCEDURE — 84443 ASSAY THYROID STIM HORMONE: CPT | Performed by: FAMILY MEDICINE

## 2019-05-08 PROCEDURE — 82950 GLUCOSE TEST: CPT | Performed by: FAMILY MEDICINE

## 2019-05-08 PROCEDURE — 00000218 ZZHCL STATISTIC OBHBG - HEMOGLOBIN: Performed by: FAMILY MEDICINE

## 2019-05-08 PROCEDURE — 84439 ASSAY OF FREE THYROXINE: CPT | Performed by: FAMILY MEDICINE

## 2019-05-08 PROCEDURE — 99207 ZZC PRENATAL VISIT: CPT | Performed by: FAMILY MEDICINE

## 2019-05-08 PROCEDURE — 36415 COLL VENOUS BLD VENIPUNCTURE: CPT | Performed by: FAMILY MEDICINE

## 2019-05-08 RX ORDER — ONDANSETRON 4 MG/1
4-8 TABLET, FILM COATED ORAL EVERY 8 HOURS PRN
Qty: 20 TABLET | Refills: 0 | Status: SHIPPED | OUTPATIENT
Start: 2019-05-08 | End: 2019-10-08

## 2019-05-08 NOTE — NURSING NOTE
"Chief Complaint   Patient presents with     Prenatal Care     1 hr glucose.   Flying Sunday to [place]- would like to discuss motion sickness on plane as well as travel safety.        Initial /75   Pulse 93   Temp 97.7  F (36.5  C) (Oral)   Resp 18   Wt 83.1 kg (183 lb 3.2 oz)   LMP 11/01/2018 (Exact Date)   BMI 31.20 kg/m   Estimated body mass index is 31.2 kg/m  as calculated from the following:    Height as of 1/10/19: 1.632 m (5' 4.25\").    Weight as of this encounter: 83.1 kg (183 lb 3.2 oz).  Medication Reconciliation: complete  Thierno Campos CMA    "

## 2019-05-08 NOTE — PATIENT INSTRUCTIONS
Report to or call [facility] [phone] for concerns about pregnancy or Labor.      Next visit 4 weeks

## 2019-05-08 NOTE — PROGRESS NOTES
Doing well.  No concerns today.  1 hour GTT done today.  Prenatal flowsheet information is reviewed.  Discussed kick counts and fetal movement.  Reportable signs and symptoms discussed.  F/u 4 weeks

## 2019-06-05 ENCOUNTER — PRENATAL OFFICE VISIT (OUTPATIENT)
Dept: FAMILY MEDICINE | Facility: CLINIC | Age: 34
End: 2019-06-05
Payer: COMMERCIAL

## 2019-06-05 VITALS
SYSTOLIC BLOOD PRESSURE: 111 MMHG | DIASTOLIC BLOOD PRESSURE: 73 MMHG | HEART RATE: 99 BPM | BODY MASS INDEX: 31.51 KG/M2 | TEMPERATURE: 98.4 F | RESPIRATION RATE: 20 BRPM | WEIGHT: 185 LBS | OXYGEN SATURATION: 97 %

## 2019-06-05 DIAGNOSIS — Z23 NEED FOR TDAP VACCINATION: Primary | ICD-10-CM

## 2019-06-05 DIAGNOSIS — Z34.83 ENCOUNTER FOR SUPERVISION OF OTHER NORMAL PREGNANCY IN THIRD TRIMESTER: ICD-10-CM

## 2019-06-05 PROCEDURE — 99207 ZZC PRENATAL VISIT: CPT | Performed by: FAMILY MEDICINE

## 2019-06-05 PROCEDURE — 90471 IMMUNIZATION ADMIN: CPT | Performed by: FAMILY MEDICINE

## 2019-06-05 PROCEDURE — 90715 TDAP VACCINE 7 YRS/> IM: CPT | Performed by: FAMILY MEDICINE

## 2019-06-05 ASSESSMENT — PAIN SCALES - GENERAL: PAINLEVEL: NO PAIN (0)

## 2019-06-05 NOTE — NURSING NOTE
Prior to injection verified patient identity using patient's name and date of birth.    Patient instructed to wait in clinic for 20 minutes following injection and to notify staff of any adverse reactions immediately.     Screening Questionnaire for Adult Immunization     Are you sick today?   No   Do you have allergies to medications, food or any vaccine?   No   Have you ever had a serious reaction after receiving a vaccination?   No   Do you have a long-term health problem with heart disease, lung disease,  asthma, kidney disease, diabetes, anemia, metabolic or blood disease?   No   Do you have cancer, leukemia, AIDS, or any immune system problem?   No   Do you take cortisone, prednisone, other steroids, or anticancer drugs, or  have you had any x-ray (radiation) treatments?   No   Have you had a seizure, brain, or other nervous system problem?   No   During the past year, have you received a transfusion of blood or blood       products, or been given a medicine called immune (gamma) globulin?   No   For women: Are you pregnant or is there a chance you could become         pregnant during the next month?   Yes   Have you received any vaccinations in the past 4 weeks?   No    Immunization questionnaire was positive for at least one answer.  Notified Dr. Real.      MNV doesn't apply on this patient  Thierno Campos, [facility]

## 2019-06-05 NOTE — PROGRESS NOTES
Doing well.  No concerns today.  Prenatal flowsheet information is reviewed.  Discussed kick counts and fetal movement.  Reportable signs and symptoms discussed.  tdap today  Reviewed glucola/tsh labs  F/u  2 weeks

## 2019-06-05 NOTE — PATIENT INSTRUCTIONS
Report to or call [facility] [phone] for concerns about pregnancy or Labor.    Next visit 2 weeks      Complete preregistration at [employer] [employer] [facility].

## 2019-06-05 NOTE — NURSING NOTE
"Chief Complaint   Patient presents with     Prenatal Care       Initial /73   Pulse 99   Temp 98.4  F (36.9  C) (Oral)   Resp 20   Wt 83.9 kg (185 lb)   LMP 11/01/2018 (Exact Date)   SpO2 97%   BMI 31.51 kg/m   Estimated body mass index is 31.51 kg/m  as calculated from the following:    Height as of 1/10/19: 1.632 m (5' 4.25\").    Weight as of this encounter: 83.9 kg (185 lb).    Katie Quick, CMA    "

## 2019-06-21 ENCOUNTER — PRENATAL OFFICE VISIT (OUTPATIENT)
Dept: FAMILY MEDICINE | Facility: CLINIC | Age: 34
End: 2019-06-21
Payer: COMMERCIAL

## 2019-06-21 VITALS
DIASTOLIC BLOOD PRESSURE: 77 MMHG | HEART RATE: 85 BPM | BODY MASS INDEX: 31.51 KG/M2 | OXYGEN SATURATION: 97 % | SYSTOLIC BLOOD PRESSURE: 121 MMHG | WEIGHT: 185 LBS

## 2019-06-21 DIAGNOSIS — Z34.83 ENCOUNTER FOR SUPERVISION OF OTHER NORMAL PREGNANCY IN THIRD TRIMESTER: ICD-10-CM

## 2019-06-21 PROCEDURE — 99207 ZZC PRENATAL VISIT: CPT | Performed by: FAMILY MEDICINE

## 2019-06-21 NOTE — PROGRESS NOTES
Doing well.  No concerns today.  Prenatal flowsheet information is reviewed.  Discussed kick counts and fetal movement.  Reportable signs and symptoms discussed.  Next visit 2 weeks

## 2019-07-02 ENCOUNTER — PRENATAL OFFICE VISIT (OUTPATIENT)
Dept: FAMILY MEDICINE | Facility: CLINIC | Age: 34
End: 2019-07-02
Payer: COMMERCIAL

## 2019-07-02 VITALS
TEMPERATURE: 98.3 F | DIASTOLIC BLOOD PRESSURE: 78 MMHG | SYSTOLIC BLOOD PRESSURE: 121 MMHG | BODY MASS INDEX: 31.95 KG/M2 | HEART RATE: 93 BPM | RESPIRATION RATE: 16 BRPM | WEIGHT: 187.6 LBS

## 2019-07-02 DIAGNOSIS — Z34.83 ENCOUNTER FOR SUPERVISION OF OTHER NORMAL PREGNANCY IN THIRD TRIMESTER: ICD-10-CM

## 2019-07-02 PROCEDURE — 99207 ZZC PRENATAL VISIT: CPT | Performed by: FAMILY MEDICINE

## 2019-07-02 NOTE — NURSING NOTE
"Chief Complaint   Patient presents with     Prenatal Care       Initial /78   Pulse 93   Temp 98.3  F (36.8  C) (Oral)   Resp 16   Wt 85.1 kg (187 lb 9.6 oz)   LMP 11/01/2018 (Exact Date)   BMI 31.95 kg/m   Estimated body mass index is 31.95 kg/m  as calculated from the following:    Height as of 1/10/19: 1.632 m (5' 4.25\").    Weight as of this encounter: 85.1 kg (187 lb 9.6 oz).  Medication Reconciliation: complete  Thierno Campos CMA    "

## 2019-07-02 NOTE — PATIENT INSTRUCTIONS
Report to or call [facility] [phone] for concerns about pregnancy or Labor.    Next visit 2 weeks

## 2019-07-02 NOTE — PROGRESS NOTES
Doing well.  No concerns today.  Cervix check and GBS next visit.  Prenatal flowsheet information is reviewed.  Discussed kick counts and fetal movement.  Reportable signs and symptoms discussed.  Next visit 2 weeks

## 2019-07-15 ENCOUNTER — PRENATAL OFFICE VISIT (OUTPATIENT)
Dept: FAMILY MEDICINE | Facility: CLINIC | Age: 34
End: 2019-07-15
Payer: COMMERCIAL

## 2019-07-15 VITALS
WEIGHT: 188.6 LBS | TEMPERATURE: 98.4 F | RESPIRATION RATE: 16 BRPM | SYSTOLIC BLOOD PRESSURE: 117 MMHG | DIASTOLIC BLOOD PRESSURE: 73 MMHG | BODY MASS INDEX: 32.12 KG/M2 | HEART RATE: 99 BPM

## 2019-07-15 DIAGNOSIS — Z34.83 ENCOUNTER FOR SUPERVISION OF OTHER NORMAL PREGNANCY IN THIRD TRIMESTER: ICD-10-CM

## 2019-07-15 PROCEDURE — 99207 ZZC PRENATAL VISIT: CPT | Performed by: FAMILY MEDICINE

## 2019-07-15 NOTE — PROGRESS NOTES
Doing well.  No concerns today.  Cervix check and GBS next visit.  Prenatal flowsheet information is reviewed.  Discussed signs of labor and when to call or come in.  Discussed kick counts and fetal movement.  Reportable signs and symptoms discussed.  [place], needs episode report at next visit.

## 2019-07-15 NOTE — NURSING NOTE
"Chief Complaint   Patient presents with     Prenatal Care       Initial /73   Pulse 99   Temp 98.4  F (36.9  C) (Oral)   Resp 16   Wt 85.5 kg (188 lb 9.6 oz)   LMP 11/01/2018 (Exact Date)   BMI 32.12 kg/m   Estimated body mass index is 32.12 kg/m  as calculated from the following:    Height as of 1/10/19: 1.632 m (5' 4.25\").    Weight as of this encounter: 85.5 kg (188 lb 9.6 oz).  Medication Reconciliation: complete  Thierno Campos CMA    "

## 2019-07-15 NOTE — PATIENT INSTRUCTIONS
[facility] [phone] for concerns about pregnancy or Labor.    Weekly visits

## 2019-07-22 ENCOUNTER — TELEPHONE (OUTPATIENT)
Dept: FAMILY MEDICINE | Facility: CLINIC | Age: 34
End: 2019-07-22

## 2019-07-22 NOTE — TELEPHONE ENCOUNTER
Pt would like you to fill out paperwork for her FMLA and short term disability, please.  She would like to pick it up on the day of her appointment if possible which is Thursday 7/25/19    Thank you

## 2019-07-25 ENCOUNTER — PRENATAL OFFICE VISIT (OUTPATIENT)
Dept: FAMILY MEDICINE | Facility: CLINIC | Age: 34
End: 2019-07-25
Payer: COMMERCIAL

## 2019-07-25 VITALS
RESPIRATION RATE: 16 BRPM | BODY MASS INDEX: 32.02 KG/M2 | SYSTOLIC BLOOD PRESSURE: 127 MMHG | HEART RATE: 98 BPM | TEMPERATURE: 98.4 F | WEIGHT: 188 LBS | DIASTOLIC BLOOD PRESSURE: 80 MMHG

## 2019-07-25 DIAGNOSIS — Z34.83 ENCOUNTER FOR SUPERVISION OF OTHER NORMAL PREGNANCY IN THIRD TRIMESTER: ICD-10-CM

## 2019-07-25 PROCEDURE — 87653 STREP B DNA AMP PROBE: CPT | Performed by: FAMILY MEDICINE

## 2019-07-25 PROCEDURE — 99207 ZZC PRENATAL VISIT: CPT | Performed by: FAMILY MEDICINE

## 2019-07-25 NOTE — PROGRESS NOTES
Doing well.  No concerns today.  Cervix is posterior, finger-tip dilated and soft. High/25%  Cephalic position confirmed by Leopold maneuvers and cervical exam.  GBS done today.  Prenatal flowsheet information is reviewed.  Discussed signs of labor and when to call or come in.  Discussed kick counts and fetal movement.  Reportable signs and symptoms discussed.  F/u 2 wk

## 2019-07-25 NOTE — NURSING NOTE
"Chief Complaint   Patient presents with     Prenatal Care     sharp pains x 2 days lower right abdomen, nausea x 1 week       Initial /80   Pulse 98   Temp 98.4  F (36.9  C) (Oral)   Resp 16   Wt 85.3 kg (188 lb)   LMP 11/01/2018 (Exact Date)   BMI 32.02 kg/m   Estimated body mass index is 32.02 kg/m  as calculated from the following:    Height as of 1/10/19: 1.632 m (5' 4.25\").    Weight as of this encounter: 85.3 kg (188 lb).  Medication Reconciliation: complete  Thierno Campos CMA    "

## 2019-07-25 NOTE — PATIENT INSTRUCTIONS
Report to or call [facility] [phone] for concerns about pregnancy or Labor.      Next visit 2 weeks

## 2019-07-26 LAB
GP B STREP DNA SPEC QL NAA+PROBE: NEGATIVE
SPECIMEN SOURCE: NORMAL

## 2019-07-31 ENCOUNTER — NURSE TRIAGE (OUTPATIENT)
Dept: NURSING | Facility: CLINIC | Age: 34
End: 2019-07-31

## 2019-07-31 NOTE — TELEPHONE ENCOUNTER
32 y/o female calls about some red spotting when she wiped, no additional bleeding, all other triage questions negative, RN reviewed protocols, advised her that a small amount of spotting on the toilet paper is common after sexual intercourse or pelvic exam and can also happen on its own as the cervix expands, could also be a hemorrhoid, If spotting continues >24 hours or gets heavier call back.    Nadira Maloney RN - [place] Nurse Advisor  2019   4:09AM    Additional Information    Negative: Passed out (i.e., lost consciousness, collapsed and was not responding)    Negative: Shock suspected (e.g., cold/pale/clammy skin, too weak to stand, low BP, rapid pulse)    Negative: Difficult to awaken or acting confused (e.g., disoriented, slurred speech)    Negative: SEVERE vaginal bleeding (e.g., continuous red blood from vagina, large blood clots)    Negative: [1] SEVERE abdominal pain (e.g., excruciating) AND [2] constant AND [3] present > 1 hour    Negative: Sounds like a life-threatening emergency to the triager    Negative: MILD-MODERATE vaginal bleeding (i.e., small to medium clots; like mild menstrual period)    Negative: Abdominal pain or having contractions    Negative: Leakage of fluid from vagina (or caller thinks she has ruptured her bag of james)    Negative: Baby moving less today (e.g., kick count < 5 in 1 hour or < 10 in 2 hours)    Negative: [1] Pregnant 24-36 weeks () AND [2] pinkish or brownish mucous discharge    Single episode of faint spotting (e.g., noted when wiping after going to bathroom)    Negative: Slight spotting after a pelvic examination (brief episode)    Negative: Slight spotting after sexual intercourse (brief episode)    Negative: [1] Pregnant > 36 weeks (term) AND [2] passed a small glob or chunk of mucous (may look like gelatin or snot)    Negative: [1] Pregnant > 36 weeks AND [2] pinkish or brownish mucous discharge    Negative: [1] Pregnant 20-23 weeks AND [2] pinkish or  brownish mucous discharge    Protocols used: PREGNANCY - VAGINAL BLEEDING GREATER THAN 20 WEEKS EGA-A-AH

## 2019-08-07 ENCOUNTER — PRENATAL OFFICE VISIT (OUTPATIENT)
Dept: FAMILY MEDICINE | Facility: CLINIC | Age: 34
End: 2019-08-07
Payer: COMMERCIAL

## 2019-08-07 VITALS
BODY MASS INDEX: 32.44 KG/M2 | OXYGEN SATURATION: 97 % | WEIGHT: 190 LBS | SYSTOLIC BLOOD PRESSURE: 109 MMHG | DIASTOLIC BLOOD PRESSURE: 67 MMHG | HEART RATE: 95 BPM | HEIGHT: 64 IN | TEMPERATURE: 98.7 F | RESPIRATION RATE: 18 BRPM

## 2019-08-07 DIAGNOSIS — Z34.83 ENCOUNTER FOR SUPERVISION OF OTHER NORMAL PREGNANCY IN THIRD TRIMESTER: ICD-10-CM

## 2019-08-07 PROCEDURE — 99207 ZZC PRENATAL VISIT: CPT | Performed by: FAMILY MEDICINE

## 2019-08-07 ASSESSMENT — MIFFLIN-ST. JEOR: SCORE: 1560.8

## 2019-08-07 NOTE — PATIENT INSTRUCTIONS
[facility] [phone] for concerns about pregnancy or Labor.    Weekly visits

## 2019-08-07 NOTE — PROGRESS NOTES
Doing well.  No concerns today.  Cervix is 2-3/75%/-3/soft/post. bsp=5  Cervix check next visit.  Cephalic position confirmed by Leopold maneuvers and cervical exam.  GBS neg.  Prenatal flowsheet information is reviewed.  Discussed signs of labor and when to call or come in.  Discussed kick counts and fetal movement.  Reportable signs and symptoms discussed.   F/u 1 week

## 2019-08-07 NOTE — NURSING NOTE
"Chief Complaint   Patient presents with     Prenatal Care       Initial /67   Pulse 95   Temp 98.7  F (37.1  C) (Oral)   Resp 18   Ht 1.632 m (5' 4.25\")   Wt 86.2 kg (190 lb)   LMP 11/01/2018 (Exact Date)   SpO2 97%   BMI 32.36 kg/m   Estimated body mass index is 32.36 kg/m  as calculated from the following:    Height as of this encounter: 1.632 m (5' 4.25\").    Weight as of this encounter: 86.2 kg (190 lb).    Katie Quick CMA    "

## 2019-08-09 ENCOUNTER — TELEPHONE (OUTPATIENT)
Dept: FAMILY MEDICINE | Facility: CLINIC | Age: 34
End: 2019-08-09

## 2019-08-09 NOTE — TELEPHONE ENCOUNTER
Patient had a quarter sized of goopey discharge, no blood tinge. No other symptoms at this time. Patient wondering if losing mucous plug  Advised patient possibly start of losing her mucous plug, body getting ready for labor  Patient is going out of town, [place]. Discussed symptoms such as water breaking, consistent contractions to watch for  Also advised if patient has questions to call L&D at [facility] where delivering.    Isabel LIEBERMANN, RN, CPN

## 2019-08-14 ENCOUNTER — PRENATAL OFFICE VISIT (OUTPATIENT)
Dept: FAMILY MEDICINE | Facility: CLINIC | Age: 34
End: 2019-08-14
Payer: COMMERCIAL

## 2019-08-14 VITALS
WEIGHT: 188.8 LBS | BODY MASS INDEX: 32.16 KG/M2 | HEART RATE: 93 BPM | RESPIRATION RATE: 18 BRPM | SYSTOLIC BLOOD PRESSURE: 124 MMHG | TEMPERATURE: 98.3 F | DIASTOLIC BLOOD PRESSURE: 78 MMHG

## 2019-08-14 DIAGNOSIS — Z34.83 ENCOUNTER FOR SUPERVISION OF OTHER NORMAL PREGNANCY IN THIRD TRIMESTER: ICD-10-CM

## 2019-08-14 PROCEDURE — 99207 ZZC PRENATAL VISIT: CPT | Performed by: FAMILY MEDICINE

## 2019-08-14 NOTE — PROGRESS NOTES
Doing well.  No concerns today.  Cervix is 3/75%/-3/soft/mid. bshp=7  Cephalic position confirmed by Leopold maneuvers and cervical exam.  Prenatal flowsheet information is reviewed.  Discussed signs of labor and when to call or come in.  Discussed kick counts and fetal movement.  Reportable signs and symptoms discussed.  Weekly visits

## 2019-08-14 NOTE — PATIENT INSTRUCTIONS
[facility] [phone] for concerns about pregnancy or Labor.      Weekly visits

## 2019-08-14 NOTE — NURSING NOTE
"Chief Complaint   Patient presents with     Prenatal Care     Had discharge on Friday- spoke to triage.  Baby still moving well-usually moves more when more relaxed.  Still having lower right abdominal pain.        Initial /78   Pulse 93   Temp 98.3  F (36.8  C) (Oral)   Resp 18   Wt 85.6 kg (188 lb 12.8 oz)   LMP 11/01/2018 (Exact Date)   BMI 32.16 kg/m   Estimated body mass index is 32.16 kg/m  as calculated from the following:    Height as of 8/7/19: 1.632 m (5' 4.25\").    Weight as of this encounter: 85.6 kg (188 lb 12.8 oz).  Medication Reconciliation: complete  Thierno Campos CMA    "

## 2019-08-16 ENCOUNTER — MYC MEDICAL ADVICE (OUTPATIENT)
Dept: FAMILY MEDICINE | Facility: CLINIC | Age: 34
End: 2019-08-16

## 2019-08-19 NOTE — TELEPHONE ENCOUNTER
No space on Monday, unable to induce prior to 41 weeks unless cervical exam scores to 8, her last cx check score was 7.    Will recheck at visit tomorrow and if score of 8 will call to find time to induce.

## 2019-08-19 NOTE — TELEPHONE ENCOUNTER
Patient/parent is informed of MD note below, as it is written. Verbalized good understanding.  Louann Reyes RN

## 2019-08-20 ENCOUNTER — PRENATAL OFFICE VISIT (OUTPATIENT)
Dept: FAMILY MEDICINE | Facility: CLINIC | Age: 34
End: 2019-08-20
Payer: COMMERCIAL

## 2019-08-20 VITALS
BODY MASS INDEX: 32.53 KG/M2 | SYSTOLIC BLOOD PRESSURE: 127 MMHG | WEIGHT: 191 LBS | TEMPERATURE: 98.1 F | DIASTOLIC BLOOD PRESSURE: 81 MMHG | HEART RATE: 96 BPM | RESPIRATION RATE: 18 BRPM

## 2019-08-20 DIAGNOSIS — Z34.83 ENCOUNTER FOR SUPERVISION OF OTHER NORMAL PREGNANCY IN THIRD TRIMESTER: ICD-10-CM

## 2019-08-20 PROCEDURE — 99207 ZZC PRENATAL VISIT: CPT | Performed by: FAMILY MEDICINE

## 2019-08-20 NOTE — NURSING NOTE
"Chief Complaint   Patient presents with     Prenatal Care       Initial /81   Pulse 96   Temp 98.1  F (36.7  C) (Oral)   Resp 18   Wt 86.6 kg (191 lb)   LMP 11/01/2018 (Exact Date)   BMI 32.53 kg/m   Estimated body mass index is 32.53 kg/m  as calculated from the following:    Height as of 8/7/19: 1.632 m (5' 4.25\").    Weight as of this encounter: 86.6 kg (191 lb).  Medication Reconciliation: complete  Thierno Campos CMA    "

## 2019-08-20 NOTE — PROGRESS NOTES
Doing well.  No concerns today.  Cervix is 4/75%/-2/soft/mid bshp=8.  Vertex position confirmed by Leopold maneuvers and cervical exam.  Prenatal flowsheet information is reviewed.  Discussed indications, risks, complications and failure rate of inductions.  Discussed signs of labor and when to call or come in.  Discussed kick counts and fetal movement.  Reportable signs and symptoms discussed.  Induction 8/21/19 at 0600 - artificial rupture of membranes, pitocin

## 2019-08-20 NOTE — PATIENT INSTRUCTIONS
Report to [facility] at 6:00 AM for induction, or call  [phone] for concerns about pregnancy or Labor.

## 2019-08-23 ENCOUNTER — MYC MEDICAL ADVICE (OUTPATIENT)
Dept: FAMILY MEDICINE | Facility: CLINIC | Age: 34
End: 2019-08-23

## 2019-08-23 ENCOUNTER — TELEPHONE (OUTPATIENT)
Dept: FAMILY MEDICINE | Facility: CLINIC | Age: 34
End: 2019-08-23

## 2019-08-23 NOTE — LETTER
[facility]  [address] [facility] [address]  Phone: [phone]    08/23/19    Arely Hebert  [address] [place] [facility] [address]      To whom it may concern:     Arely MAURICIO Hebert, 1985 gave birth, without complications on 8/21/2019.     Sincerely,      Whit Real MD

## 2019-08-23 NOTE — TELEPHONE ENCOUNTER
Patient states she needs something on [place] letterhead that states when she delivered her baby for FMLA.  Please call when ready for .    Thank you.

## 2019-09-04 ENCOUNTER — MYC MEDICAL ADVICE (OUTPATIENT)
Dept: FAMILY MEDICINE | Facility: CLINIC | Age: 34
End: 2019-09-04

## 2019-09-04 NOTE — TELEPHONE ENCOUNTER
To Dr. Real.  Information I sent to her on her vaginal bleeding is from the Telephone Triage for OB/GYN 2nd Edition Ronda Hinton; postpartum vaginal bleeding page [phone]    Information sent regarding feeding frequency and duration is from up to date infant feeding schedules.

## 2019-09-16 ENCOUNTER — MYC MEDICAL ADVICE (OUTPATIENT)
Dept: FAMILY MEDICINE | Facility: CLINIC | Age: 34
End: 2019-09-16

## 2019-10-02 NOTE — PROGRESS NOTES
SUBJECTIVE: Arely is here for a 6-week postpartum checkup.    Delivery date was 19. She had a  of a viable girl, weight 6 pounds 10 oz., with no complications.  Since delivery, she has not been breast feeding.  She has no signs of infection, bleeding or other complications.  She is not pregnant.  We discussed contraceptions and she has chosen Mirena IUD.  She  has had intercourse since delivery and complains of No discomfort. Patient screened for postpartum depression and complaints are NEGATIVE. Screening has also been completed for intimate partner violence.  Job change causing some chaos and now with 3 kids.    EXAM:  Today's Depression Rating was   PHQ-9 SCORE 10/8/2019   PHQ-9 Total Score -   PHQ-9 Total Score MyChart 2 (Minimal depression)   PHQ-9 Total Score 2       GENERAL healthy, alert and no distress  GYN PELVIC: normal external genitalia, normal urethral meatus, normal vaginal mucosa, normal cervix, normal adnexa, no masses or tenderness and uterus normal size and shape  MS: Extremities normal. No deformaties, edema or skin discoloration.  SKIN: no ulcers, lesions or rash  PSYCH: normal cognition, normal affect    ASSESSMENT:   (Z39.2) Routine postpartum follow-up  (primary encounter diagnosis)  Comment: doing well  Plan: HCG Qual, Urine ([id number])        Neg hcg  F/u for preventive in 1 yr    (Z30.430) Encounter for insertion of intrauterine contraceptive device  Comment: desires Mirena  Plan: HCG Qual, Urine ([id number]), levonorgestrel         (MIRENA) 20 MCG/24HR IUD 20 mcg, INSERTION         INTRAUTERINE DEVICE, levonorgestrel (MIRENA) 20        MCG/24HR IUD, CANCELED: Beta HCG qual IFA urine        IUD INSERTION:  I discussed the method, effectiveness, contraindications, risk, benefits, alternatives and the insertion procedure itself.  Patient was an appropriate candidate and desired to proceed.    After appropriate preparation, the cervix was grasped with a tenaculum and the uterine  cavity sounded to 6 cm.  The mirena IUD was inserted using standard and sterile technique.  The strings were trimmed to approximately 2.5cm.  No complications. Patient tolerated the procedure well.    IUD Lot#: [id number]  Exp Date: 1/22  NDC#:20892-440-34    The IUD effectiveness is 5 years.   Followup should be in 3 months with Whit Real MD .  Alert clinic to any problems.            Answers for HPI/ROS submitted by the patient on 10/8/2019   If you checked off any problems, how difficult have these problems made it for you to do your work, take care of things at home, or get along with other people?: Not difficult at all  PHQ9 TOTAL SCORE: 2

## 2019-10-08 ENCOUNTER — PRENATAL OFFICE VISIT (OUTPATIENT)
Dept: FAMILY MEDICINE | Facility: CLINIC | Age: 34
End: 2019-10-08
Payer: COMMERCIAL

## 2019-10-08 VITALS
DIASTOLIC BLOOD PRESSURE: 77 MMHG | RESPIRATION RATE: 16 BRPM | WEIGHT: 184.8 LBS | TEMPERATURE: 98.1 F | BODY MASS INDEX: 31.47 KG/M2 | SYSTOLIC BLOOD PRESSURE: 125 MMHG | HEART RATE: 93 BPM

## 2019-10-08 DIAGNOSIS — Z30.430 ENCOUNTER FOR INSERTION OF INTRAUTERINE CONTRACEPTIVE DEVICE: ICD-10-CM

## 2019-10-08 LAB — HCG UR QL: NEGATIVE

## 2019-10-08 PROCEDURE — 99207 ZZC POST PARTUM EXAM: CPT | Performed by: FAMILY MEDICINE

## 2019-10-08 PROCEDURE — 90686 IIV4 VACC NO PRSV 0.5 ML IM: CPT | Performed by: FAMILY MEDICINE

## 2019-10-08 PROCEDURE — 58300 INSERT INTRAUTERINE DEVICE: CPT | Performed by: FAMILY MEDICINE

## 2019-10-08 PROCEDURE — 90471 IMMUNIZATION ADMIN: CPT | Performed by: FAMILY MEDICINE

## 2019-10-08 PROCEDURE — 81025 URINE PREGNANCY TEST: CPT | Performed by: FAMILY MEDICINE

## 2019-10-08 ASSESSMENT — PATIENT HEALTH QUESTIONNAIRE - PHQ9
SUM OF ALL RESPONSES TO PHQ QUESTIONS 1-9: 2
10. IF YOU CHECKED OFF ANY PROBLEMS, HOW DIFFICULT HAVE THESE PROBLEMS MADE IT FOR YOU TO DO YOUR WORK, TAKE CARE OF THINGS AT HOME, OR GET ALONG WITH OTHER PEOPLE: NOT DIFFICULT AT ALL
SUM OF ALL RESPONSES TO PHQ QUESTIONS 1-9: 2

## 2019-10-08 NOTE — NURSING NOTE
"Chief Complaint   Patient presents with     Post Partum Exam       Initial /77   Pulse 93   Temp 98.1  F (36.7  C) (Oral)   Resp 16   Wt 83.8 kg (184 lb 12.8 oz)   LMP 11/01/2018 (Exact Date)   Breastfeeding? Unknown   BMI 31.47 kg/m   Estimated body mass index is 31.47 kg/m  as calculated from the following:    Height as of 8/7/19: 1.632 m (5' 4.25\").    Weight as of this encounter: 83.8 kg (184 lb 12.8 oz).  Medication Reconciliation: complete  Thierno Campos CMA    "

## 2019-10-08 NOTE — LETTER
"                                                                          Affirmation of Informed Consent for Surgery or Invasive Procedure    1.  I, (print patient's name) Arely Hebert,  1985,   a.  Agree that I will have (include both the medical term and patient words):           Chief Complaint   Patient presents with     Post Partum Exam      b.  At [facility].     c.  The reason for this procedure is (medical condition):  IUD.   d.  This will be done or supervised by:  Whit Real MD.   e.  My doctor may have help from others.  Help could include opening or closing         the wound. Help might also include taking grafts, cutting out tissue,                           implanting devices.  I have been told who will help, if known.     2.  I have talked to my doctor or health care team about:   a.  What the procedure is and what will happen.   b   How it may help me (the benefits).   c.  How it may harm me (the most likely and most serious risks).   d.  The long-term effects the procedure might have.    e.  My other choices for treatment.  The risks and benefits of those choices.    f.   What will likely happen if I say \"no\" to this procedure.    g.  How I might feel right after and how quickly I might be expected to recover.      h.  What medicines will be used to manage pain or sedate me.     3.  I agree that:  (If I do not agree with a statement, I have crossed it out and              initialed next to it.)       a.  I will ask questions.     b.  No one has promised me definite results.    c.  If serious problems are found during the procedure, the treatment may                    change.   d.  If I have \"do not resuscitate\" (DNR) wishes, I have discussed this with my                              doctor and they will be put on hold during the procedure.   e. Students and other appropriate people, approved by the facility, may watch                      the procedure and help with " tasks they are qualified for.                                                    f.  Pictures or videos may be taken. They may be used for medical or                  educational reasons only.       g. Tissues or organs removed from my body as part of the normal course of the                    procedure may be used for research or teaching purposes. They will be                  disposed of with respect.                    h.  If a staff person is exposed to my blood or body fluids, my blood will be drawn                   and tested for HIV and hepatitis.  I understand that by law, the test results will                    go:         -  In my medical record.                         -  To the Employee Occupational Health Service and/or Infection Control                                  at this facility.    -  To the [place] [facility] officials.      i.  I may have a blood transfusion: I have talked to my doctor or care team about:    -  Why I may need a blood transfusion.     - The risks, benefits, and side effects of transfusion - and the risks of not        Having one.     -  Blood safety and other options for treatment.        Consent for blood transfusion obtained during a hospital admission is valid for the entire hospital stay.  Consent  for blood transfusion obtained in the clinic setting is valid for a year from the signature date.                                4.  I understand that:   a.  I can change my mind.  If I do, I must tell my doctor or team as soon as                           possible.              b.  The team members may change during the procedure.                c.   The team will double-check who I am.  They will ask me what I am having                         done and the site of the site of the procedure.  This is done for my safety.    My questions have been answered.  I agree to the procedure.  I have made my special needs and instructions known.      Arely MARTÍNEZ  Anup      10/8/2019 8:30 AM  Patient (or representative)/Relationship to patient             Date  Time        Witness:  I have verified that the signature is that of the patient or patient's representative and that this has been signed before the procedure:    NAME:        10/8/2019  8:30 AM         Date  Time  Person verifying patient's name or patient representative's signature     Provider:  I have discussed the procedure and the information stated above with the patient (or the patient's representative) and answered their questions. The patient or their representative consented to the procedure:      Whit Real MD    10/8/2019  8:30 AM  Physician or Provider's Signature(s)   Date  Time       Intepreter (if used):       10/8/2019  8:30 AM                                   Name       Language/Organization Date  Time    Consent for procedure valid for 30 days after patient signature date     [facility]  AFFIRMATION OF INFORMED CONSENT FOR SURGERY OR INVASIVE PROCEDURE               Original - Medical Records

## 2019-10-08 NOTE — PATIENT INSTRUCTIONS
Monitor for symptoms of infection to include a foul smelling discharge, abdominal tenderness or fever without other explanation.  Monitor for heavy bleeding (soaking a maxi pad every hour for 2-3 hours).

## 2019-10-09 ASSESSMENT — PATIENT HEALTH QUESTIONNAIRE - PHQ9: SUM OF ALL RESPONSES TO PHQ QUESTIONS 1-9: 2

## 2020-02-26 ENCOUNTER — MEDICAL CORRESPONDENCE (OUTPATIENT)
Dept: HEALTH INFORMATION MANAGEMENT | Facility: CLINIC | Age: 35
End: 2020-02-26

## 2020-11-06 ENCOUNTER — ALLIED HEALTH/NURSE VISIT (OUTPATIENT)
Dept: NURSING | Facility: CLINIC | Age: 35
End: 2020-11-06
Payer: COMMERCIAL

## 2020-11-06 DIAGNOSIS — Z23 NEED FOR PROPHYLACTIC VACCINATION AND INOCULATION AGAINST INFLUENZA: Primary | ICD-10-CM

## 2020-11-06 PROCEDURE — 90686 IIV4 VACC NO PRSV 0.5 ML IM: CPT

## 2020-11-06 PROCEDURE — 90471 IMMUNIZATION ADMIN: CPT

## 2020-11-06 NOTE — PROGRESS NOTES
"Injectable Influenza Immunization Documentation    1.  Has the patient received the information for the injectable influenza vaccine? YES     2. Is the patient 6 months of age or older? YES     3. Does the patient have any of the following contraindications?         Severe allergy to eggs? No     Severe allergic reaction to previous influenza vaccines? No   Severe allergy to latex? No       History of Guillain-Dinosaur syndrome? No     Currently have a temperature greater than 100.4F? No        4.  Severely egg allergic patients should have flu vaccine eligibility assessed by an MD, RN, or pharmacist, and those who received flu vaccine should be observed for 15 min by an MD, RN, Pharmacist, Medical Technician, or member of clinic staff.\": YES    5. Latex-allergic patients should be given latex-free influenza vaccine Yes. Please reference the Vaccine latex table to determine if your clinic s product is latex-containing.       Vaccination given by Gema Ortiz MA on 11/6/2020 at 3:58 PM      "

## 2020-11-07 ENCOUNTER — VIRTUAL VISIT (OUTPATIENT)
Dept: FAMILY MEDICINE | Facility: OTHER | Age: 35
End: 2020-11-07

## 2020-11-07 ENCOUNTER — NURSE TRIAGE (OUTPATIENT)
Dept: NURSING | Facility: CLINIC | Age: 35
End: 2020-11-07

## 2020-11-08 NOTE — PROGRESS NOTES
"Date: 2020 19:56:57  Clinician: Malou Almeida  Clinician NPI: [id number]  Patient: Arely Hebert  Patient : 1985  Patient Address: [address] [place] [address], [place] [address]  Patient Phone: [phone]  Visit Protocol: URI  Patient Summary:  Arely is a 34 year old ( : 1985 ) female who initiated a [facility] Visit for COVID-19 (Coronavirus) evaluation and screening. When asked the question \"Please sign me up to receive news, health information and promotions. \", Arely responded \"No\".    When asked when her symptoms started, Arely reported that she does not have any symptoms.   She denies taking antibiotic medication in the past month and having recent facial or sinus surgery in the past 60 days.    Pertinent COVID-19 (Coronavirus) information  Arely does not work or volunteer as healthcare worker or a . In the past 14 days, Arely has not worked or volunteered at a healthcare facility or group living setting.   In the past 14 days, she also has not lived in a congregate living setting.   Arely has had a close contact with a laboratory-confirmed COVID-19 patient in the last 14 days. She was exposed at her work. Date Arely was exposed to the laboratory-confirmed COVID-19 patient: 10/04/2020   Additional information about contact with COVID-19 (Coronavirus) patient as reported by the patient (free text): I work in a  center, and this person tested positive on 10/7/2020, was tested on 10/4/2020, and apparently started having cold symptoms on 10/2/2020.   Arely is not living in the same household with the COVID-19 positive patient. She was in an enclosed space for greater than 15 minutes with the COVID-19 patient.   During the encounter, only she was wearing a mask.   Since 2019, Arely has not been tested for COVID-19 and has not had upper respiratory infection or influenza-like illness.   Pertinent medical history  Arely does not get yeast infections " when she takes antibiotics.   Arely needs a return to work/school note.   Weight: 180 lbs   Arely does not smoke or use smokeless tobacco.   She denies pregnancy and denies breastfeeding. She is currently menstruating.   Additional information as reported by the patient (free text): Scratchy throat. Feeling warm, but have not yet checked my temp.   Weight: 180 lbs    MEDICATIONS: No current medications, ALLERGIES: NKDA  Clinician Response:  Dear Arely,   Based on your exposure to COVID-19 (coronavirus), we would like to test you for this virus.  1. Please call [phone] to schedule your visit. Explain that you were referred by [facility] to have a COVID-19 test. Be ready to share your [facility] visit ID number.  * If you need to schedule in [facility] please call [phone] or for [employer] employees please call [phone].   * If you need to schedule in the [place] area please call [phone]. [place] employees call [phone].   The following will serve as your written order for this COVID Test, ordered by me, for the indication of suspected COVID [Z20.828]: The test will be ordered in [employer], our electronic health record, after you are scheduled. It will show as ordered and authorized by Ankush Sunshine MD.  Order: COVID-19 (coronavirus) PCR for ASYMPTOMATIC EXPOSURE testing from [facility].   If you know you have had close contact with someone who tested positive, you should be quarantined for 14 days after this exposure. You should stay in quarantine for the14 days even if the covid test is negative, the optimal time to test after exposure is 5-7 days from the exposure  Quarantine means   What should I do?  For safety, it's very important to follow these rules. Do this for 14 days after the date you were last exposed to the virus..  Stay home and away from others. Don't go to school or anywhere else. Generally quarantine means staying home from work but there are some exceptions to this. Please contact your  workplace.   No hugging, kissing or shaking hands.  Don't let anyone visit.  Cover your mouth and nose with a mask, tissue or washcloth to avoid spreading germs.  Wash your hands and face often. Use soap and water.  What are the symptoms of COVID-19?  The most common symptoms are cough, fever and trouble breathing. Less common symptoms include headache, body aches, fatigue (feeling very tired), chills, sore throat, stuffy or runny nose, diarrhea (loose poop), loss of taste or smell, belly pain, and nausea or vomiting (feeling sick to your stomach or throwing up).  After 14 days, if you have still don't have symptoms, you likely don't have this virus.  If you develop symptoms, follow these guidelines.  If you're normally healthy: Please start another [facility] visit to report your symptoms. Go to [email or web address].  If you have a serious health problem (like cancer, heart failure, an organ transplant or kidney disease): Call your specialty clinic. Let them know that you might have COVID-19.  2. When it's time for your COVID test:  Stay at least 6 feet away from others. (If someone will drive you to your test, stay in the backseat, as far away from the  as you can.)  Cover your mouth and nose with a mask, tissue or washcloth.  Go straight to the testing site. Don't make any stops on the way there or back.  Please note  Caregivers in these groups are at risk for severe illness due to COVID-19:  o People 65 years and older  o People who live in a nursing home or long-term care facility  o People with chronic disease (lung, heart, cancer, diabetes, kidney, liver, immunologic)  o People who have a weakened immune system, including those who:  Are in cancer treatment  Take medicine that weakens the immune system, such as corticosteroids  Had a bone marrow or organ transplant  Have an immune deficiency  Have poorly controlled HIV or AIDS  Are obese (body mass index of 40 or higher)  Smoke regularly  Where can I get more  information?  [facility] -- About COVID-19: [email or web address]  CDC -- What to Do If You're Sick: www.cdc.gov/coronavirus/2019-ncov/about/steps-when-sick.html  [facility] -- Ending Home Isolation: www.cdc.gov/coronavirus/2019-ncov/hcp/disposition-in-home-patients.html  [facility] -- Caring for Someone: www.cdc.gov/coronavirus/2019-ncov/if-you-are-sick/care-for-someone.html  [facility] -- Interim Guidance for Hospital Discharge to Home: www.[facility].[place]./diseases/coronavirus/hcp/hospdischarge.pdf  [facility] clinical trials (COVID-19 research studies): clinicalaffairs.[facility]-clinical-trials  Below are the COVID-19 hotlines at the [place] [facility] ([facility]). Interpreters are available.  For health questions: Call 352-153-2008 or 1-530.347.2660 (7 a.m. to 7 p.m.)  For questions about schools and childcare: Call 663-820-1870 or 1-454.214.9264 (7 a.m. to 7 p.m.)    Diagnosis: Worries  Diagnosis ICD: R45.82

## 2020-12-06 ENCOUNTER — HEALTH MAINTENANCE LETTER (OUTPATIENT)
Age: 35
End: 2020-12-06

## 2020-12-31 ENCOUNTER — VIRTUAL VISIT (OUTPATIENT)
Dept: URGENT CARE | Facility: CLINIC | Age: 35
End: 2020-12-31
Payer: COMMERCIAL

## 2020-12-31 DIAGNOSIS — H66.90 ACUTE OTITIS MEDIA, UNSPECIFIED OTITIS MEDIA TYPE: Primary | ICD-10-CM

## 2020-12-31 PROCEDURE — 99213 OFFICE O/P EST LOW 20 MIN: CPT | Mod: TEL | Performed by: PHYSICIAN ASSISTANT

## 2020-12-31 RX ORDER — AMOXICILLIN 500 MG/1
500 CAPSULE ORAL 3 TIMES DAILY
Qty: 30 CAPSULE | Refills: 0 | Status: SHIPPED | OUTPATIENT
Start: 2020-12-31 | End: 2021-01-10

## 2020-12-31 NOTE — PROGRESS NOTES
"Arely Hebert is a 35 year old female who is being evaluated via a billable telephone visit.      The patient has been notified of following:     \"This telephone visit will be conducted via a call between you and your physician/provider. We have found that certain health care needs can be provided without the need for a physical exam.  This service lets us provide the care you need with a short phone conversation.  If a prescription is necessary we can send it directly to your pharmacy.  If lab work is needed we can place an order for that and you can then stop by our lab to have the test done at a later time.    Telephone visits are billed at different rates depending on your insurance coverage. During this emergency period, for some insurers they may be billed the same as an in-person visit.  Please reach out to your insurance provider with any questions.    If during the course of the call the physician/provider feels a telephone visit is not appropriate, you will not be charged for this service.\"    Patient has given verbal consent for Telephone visit?  Yes    What phone number would you like to be contacted at? [phone]    How would you like to obtain your AVS? Raziat    Subjective     Arely Hebert is a 35 year old female who presents via phone visit today for the following health issues:    HPI      Patient has been having worsening pain in the right ear for the last few days. She has also had some mild congestion and cold symptoms. No fevers or chills, no sick contacts, no drainage from the ear.     Acute Illness  Acute illness concerns: ear pain   Onset/Duration: 3 days   Symptoms:  Fever: no  Chills/Sweats: no  Headache (location?): some sinus pressure   Sinus Pressure: YES  Conjunctivitis:  no  Ear Pain: YES: right  Rhinorrhea: YES  Congestion: no  Sore Throat: mild dry throat   Cough: no  Wheeze: no  Decreased Appetite: no  Nausea: no  Vomiting: no  Diarrhea: no  Dysuria/Freq.: " no  Dysuria or Hematuria: no  Fatigue/Achiness: no  Sick/Strep Exposure: no  Therapies tried and outcome: None      Review of Systems   Constitutional, HEENT, cardiovascular, pulmonary, gi and gu systems are negative, except as otherwise noted.       Objective          Vitals:  No vitals were obtained today due to virtual visit.    healthy, alert and no distress  PSYCH: Alert and oriented times 3; coherent speech, normal   rate and volume, able to articulate logical thoughts, able   to abstract reason, no tangential thoughts, no hallucinations   or delusions  Her affect is normal  RESP: No cough, no audible wheezing, able to talk in full sentences  Remainder of exam unable to be completed due to telephone visits      Assessment/Plan:  ASSESSMENT AND PLAN    ICD-10-CM    1. Acute otitis media, unspecified otitis media type  H66.90 amoxicillin (AMOXIL) 500 MG capsule     I will have patient use sudafed heat packs and Mucinex for the next 1-2 days and if pain is persisting or worsening go ahead and start antibiotic to treat for infection.     Melinda Mcdaniels PA-C    Phone call duration:  9 minutes

## 2021-09-26 ENCOUNTER — HEALTH MAINTENANCE LETTER (OUTPATIENT)
Age: 36
End: 2021-09-26

## 2022-01-16 ENCOUNTER — HEALTH MAINTENANCE LETTER (OUTPATIENT)
Age: 37
End: 2022-01-16

## 2022-02-03 ENCOUNTER — LAB (OUTPATIENT)
Dept: LAB | Facility: CLINIC | Age: 37
End: 2022-02-03
Payer: COMMERCIAL

## 2022-02-03 DIAGNOSIS — Z20.822 CLOSE EXPOSURE TO 2019 NOVEL CORONAVIRUS: ICD-10-CM

## 2022-02-03 PROCEDURE — 99000 SPECIMEN HANDLING OFFICE-LAB: CPT

## 2022-02-04 LAB — SARS-COV-2 RNA RESP QL NAA+PROBE: NOT DETECTED

## 2023-01-08 ENCOUNTER — HEALTH MAINTENANCE LETTER (OUTPATIENT)
Age: 38
End: 2023-01-08

## 2023-04-23 ENCOUNTER — HEALTH MAINTENANCE LETTER (OUTPATIENT)
Age: 38
End: 2023-04-23

## 2024-06-29 ENCOUNTER — HEALTH MAINTENANCE LETTER (OUTPATIENT)
Age: 39
End: 2024-06-29